# Patient Record
Sex: FEMALE | Race: WHITE | NOT HISPANIC OR LATINO | Employment: FULL TIME | URBAN - NONMETROPOLITAN AREA
[De-identification: names, ages, dates, MRNs, and addresses within clinical notes are randomized per-mention and may not be internally consistent; named-entity substitution may affect disease eponyms.]

---
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
## 2022-02-01 ENCOUNTER — TRANSCRIBE ORDERS (OUTPATIENT)
Dept: ADMINISTRATIVE | Facility: HOSPITAL | Age: 22
End: 2022-02-01

## 2022-02-01 DIAGNOSIS — N63.12 MASS OF UPPER INNER QUADRANT OF RIGHT BREAST: Primary | ICD-10-CM

## 2022-02-10 ENCOUNTER — OFFICE VISIT (OUTPATIENT)
Dept: OBSTETRICS AND GYNECOLOGY | Facility: CLINIC | Age: 22
End: 2022-02-10

## 2022-02-10 VITALS
SYSTOLIC BLOOD PRESSURE: 100 MMHG | DIASTOLIC BLOOD PRESSURE: 70 MMHG | WEIGHT: 151 LBS | BODY MASS INDEX: 26.75 KG/M2 | HEIGHT: 63 IN

## 2022-02-10 DIAGNOSIS — Z30.46 NEXPLANON REMOVAL: Primary | ICD-10-CM

## 2022-02-10 PROCEDURE — 11982 REMOVE DRUG IMPLANT DEVICE: CPT | Performed by: PHYSICIAN ASSISTANT

## 2022-02-10 NOTE — PROGRESS NOTES
Nexplanon Removal    Date of procedure:  2/10/2022    Risks and benefits discussed? yes  All questions answered? yes  Consents given by the patient  Written consent obtained? yes  Reason for removal: Device expiration    Local anesthesia used:  yes - 1.0 cc's of local anesthesia: 1% lidocaine with epinephrine    Procedure documentation:    The upper left arm was marked at the intended site of removal.  Betadine was used to cleanse the skin.  Local anesthesia was injected.  A vertical incision was created at the distal tip of the implant.  The implant was removed intact without difficulty.  Steri-strips were then placed across the site of insertion and the arm was wrapped.    She tolerated the procedure well.  There were no complications.  EBL was minimal.    Post procedure instructions: Remove the wrapping in 24 hours and the steri-strips in 5 days.    Follow up needed: PRN    This note was electronically signed.    Rani Medellin PA-C.  February 10, 2022

## 2022-02-10 NOTE — PROGRESS NOTES
"Subjective   Chief Complaint   Patient presents with   • Contraception     Nexplanon removal       Sis Lerma is a 21 y.o. year old  presenting to be seen for Nexplanon removal  Nexplanon inserted by [facility] almost 3 years ago. Wants to go ahead and get Nexplanon removed now  She does not want other birth control for now but plans on discussing with her mother and will RTO or call if decides on other contraception    Past Medical History:   Diagnosis Date   • ADHD (attention deficit hyperactivity disorder)    • Anxiety    • Headache    • Tourette syndrome       No current outpatient medications on file.   No Known Allergies   Past Surgical History:   Procedure Laterality Date   • TYMPANOSTOMY TUBE PLACEMENT        Social History     Socioeconomic History   • Marital status: Single   Tobacco Use   • Smoking status: Former Smoker     Packs/day: 0.33   • Smokeless tobacco: Never Used   Vaping Use   • Vaping Use: Every day   • Substances: Nicotine, Flavoring   • Devices: Disposable   Substance and Sexual Activity   • Alcohol use: Defer   • Drug use: Never   • Sexual activity: Yes     Partners: Male     Birth control/protection: None      Family History   Problem Relation Age of Onset   • Hypertension Mother    • No Known Problems Father    • Breast cancer Maternal Grandmother    • Colon cancer Maternal Grandmother    • Diabetes Maternal Grandmother    • Hyperlipidemia Maternal Grandmother    • Hypertension Maternal Grandmother        Review of Systems        Objective   /70   Ht 160.7 cm (63.25\")   Wt 68.5 kg (151 lb)   LMP 02/10/2022 (Exact Date)   Breastfeeding No   BMI 26.54 kg/m²     Physical Exam       Result Review :                   Assessment and Plan  There are no diagnoses linked to this encounter.  There are no Patient Instructions on file for this visit.           This note was electronically signed.    Rani Medellin PA-C   February 10, 2022  "

## 2022-02-14 ENCOUNTER — HOSPITAL ENCOUNTER (OUTPATIENT)
Dept: ULTRASOUND IMAGING | Facility: HOSPITAL | Age: 22
Discharge: HOME | End: 2022-02-14
Admitting: NURSE PRACTITIONER

## 2022-02-14 DIAGNOSIS — N63.12 MASS OF UPPER INNER QUADRANT OF RIGHT BREAST: ICD-10-CM

## 2022-02-14 PROCEDURE — 76641 ULTRASOUND BREAST COMPLETE: CPT

## 2022-09-12 PROCEDURE — U0004 COV-19 TEST NON-CDC HGH THRU: HCPCS | Performed by: PERSONAL EMERGENCY RESPONSE ATTENDANT

## 2023-03-09 ENCOUNTER — LAB (OUTPATIENT)
Dept: LAB | Facility: HOSPITAL | Age: 23
End: 2023-03-09
Payer: COMMERCIAL

## 2023-03-09 ENCOUNTER — TELEPHONE (OUTPATIENT)
Dept: OBSTETRICS AND GYNECOLOGY | Facility: CLINIC | Age: 23
End: 2023-03-09
Payer: COMMERCIAL

## 2023-03-09 DIAGNOSIS — Z34.90 PREGNANCY, UNSPECIFIED GESTATIONAL AGE: Primary | ICD-10-CM

## 2023-03-09 DIAGNOSIS — Z34.90 PREGNANCY, UNSPECIFIED GESTATIONAL AGE: ICD-10-CM

## 2023-03-09 LAB — HCG INTACT+B SERPL-ACNC: NORMAL MIU/ML

## 2023-03-09 PROCEDURE — 36415 COLL VENOUS BLD VENIPUNCTURE: CPT

## 2023-03-09 PROCEDURE — 84702 CHORIONIC GONADOTROPIN TEST: CPT

## 2023-03-09 NOTE — TELEPHONE ENCOUNTER
----- Message from Anel Duff sent at 3/9/2023  9:02 AM EST -----  Patient is scheduled for new ob appointment 04/11/2023. Patient is requesting HCG order. She is unclear on her last cycle.

## 2023-03-15 ENCOUNTER — TELEPHONE (OUTPATIENT)
Dept: OBSTETRICS AND GYNECOLOGY | Facility: CLINIC | Age: 23
End: 2023-03-15
Payer: COMMERCIAL

## 2023-03-15 NOTE — TELEPHONE ENCOUNTER
Pt states she had an HCG drawn on 3/9/23 to confirm her pregnancy. As of that date she is approx 7 weeks. Labs are in her chart. She states that her periods are irregular and sometimes skips a month so she is unsure of her LMP. Based off HCG ok to schedule. She requests an appt with LOS. She denies any bleeding or pain.     Glendy HERNANDEZ Will call pt back with appt and US. She VU

## 2023-03-16 ENCOUNTER — TELEPHONE (OUTPATIENT)
Dept: OBSTETRICS AND GYNECOLOGY | Facility: CLINIC | Age: 23
End: 2023-03-16

## 2023-03-16 NOTE — TELEPHONE ENCOUNTER
PROVIDER: DR. BULL    Caller: Sis Lerma    Relationship to patient: Self    Best call back number: [phone]    NEW OB PT HAS APPT ON 3-23 AT 2:30 FOR U/S AND VISIT AT 2:30. SHE JUST WANTS TO MAKE SURE THIS IS CORRECT.   NA AT NON CLINICAL  COULD SOMEONE CALL HER TO CONFIRM?   THANKS

## 2023-03-23 ENCOUNTER — INITIAL PRENATAL (OUTPATIENT)
Dept: OBSTETRICS AND GYNECOLOGY | Facility: CLINIC | Age: 23
End: 2023-03-23
Payer: COMMERCIAL

## 2023-03-23 VITALS — WEIGHT: 140 LBS | DIASTOLIC BLOOD PRESSURE: 62 MMHG | SYSTOLIC BLOOD PRESSURE: 104 MMHG | BODY MASS INDEX: 25.61 KG/M2

## 2023-03-23 DIAGNOSIS — Z36.9 ENCOUNTER FOR ANTENATAL SCREENING: Primary | ICD-10-CM

## 2023-03-23 DIAGNOSIS — Z34.91 FIRST TRIMESTER PREGNANCY: ICD-10-CM

## 2023-03-23 RX ORDER — PRENATAL VIT NO.126/IRON/FOLIC 28MG-0.8MG
TABLET ORAL DAILY
COMMUNITY

## 2023-03-23 NOTE — PROGRESS NOTES
Initial ob visit     CC- Here for care of pregnancy        Sis Lerma is a 22 y.o. female, , who presents for her first obstetrical visit.  Her last LMP was No LMP recorded (approximate). Patient is pregnant. Her JOHN is 10/23/2023, by Ultrasound. Current GA is 9w3d.     Initial positive test date : 23, UPT        Her periods are: every 20 days  Prior obstetric issues: none  Patient's past medical history is significant for: none.  Family history of genetic issues (includes FOB): none  Prior infections concerning in pregnancy (Rash, fever in last 2 weeks): No  Varicella Hx - unknown   Prior testing for Cystic Fibrosis Carrier or Sickle Cell Trait- no  Prepregnancy BMI - Body mass index is 25.61 kg/m².  History of STD: no  Hx of HSV for patient or partner: no  Ultrasound Today: yes,  bpm    OB History    Para Term  AB Living   1 0 0 0 0 0   SAB IAB Ectopic Molar Multiple Live Births   0 0 0 0 0 0      # Outcome Date GA Lbr Moreno/2nd Weight Sex Delivery Anes PTL Lv   1 Current                Additional Pertinent History   Last Pap : never Result: N/A HPV: N/A     Last Completed Pap Smear     This patient has no relevant Health Maintenance data.        History of abnormal Pap smear: N/A  Family history of uterine, colon, breast, or ovarian cancer: yes - Breast Ca- MGM; Colon Ca- MGM & MU  Feelings of Anxiety or Depression: no  Tobacco Usage?: No   Alcohol/Drug Use?: NO  Over the age of 35 at delivery: no  Desires Genetic Screening: Cell Free DNA  Flu Status: Declines    PMH    Current Outpatient Medications:   •  prenatal vitamin (prenatal, CLASSIC, vitamin) tablet, Take  by mouth Daily., Disp: , Rfl:      Past Medical History:   Diagnosis Date   • ADHD (attention deficit hyperactivity disorder)    • Anxiety    • Headache    • Tourette syndrome         Past Surgical History:   Procedure Laterality Date   • TYMPANOSTOMY TUBE PLACEMENT         Review of Systems   Review of  Systems  Patient Reports: Nausea and vomiting and Spotting. Patient states she had some spotting initially, but none since.  Patient Denies: Heavy bleeding, Cramping and Fatigue  All systems reviewed and otherwise normal.    I have reviewed and agree with the HPI, ROS, and historical information as entered above. Maddison Tierney MD    /62   Wt 63.5 kg (140 lb)   LMP  (Approximate)   BMI 25.61 kg/m²     The additional following portions of the patient's history were reviewed and updated as appropriate: allergies, current medications, past family history, past medical history, past social history, past surgical history and problem list.    Physical Exam  General:  well developed; well nourished  no acute distress   Chest/Respiratory: No labored breathing, normal respiratory effort, normal appearance, no respiratory noises noted   Heart:  normal rate, regular rhythm,  no murmurs, rubs, or gallops   Thyroid: normal to inspection and palpation   Breasts:  Not performed.   Abdomen: soft, non-tender; no masses  no umbilical or inguinal hernias are present  no hepato-splenomegaly   Pelvis: Pap done        Assessment and Plan    Problem List Items Addressed This Visit    None  Visit Diagnoses     Encounter for  screening    -  Primary    Relevant Orders    Obstetric Panel    Urine Culture - Urine, Urine, Clean Catch    HIV-1 / O / 2 Ag / Antibody 4th Generation    Urine Drug Screen - Urine, Clean Catch    LIQUID-BASED PAP SMEAR, [facility] ([facility])    [id number] PLUS Core+SCA+ESS - Blood,    First trimester pregnancy        Relevant Orders    [id number] PLUS Core+SCA+ESS - Blood,          1. Pregnancy at 9w3d  2. Reviewed routine prenatal care with the office and educational materials given  3. Discussed options for genetic testing including first trimester nuchal translucency screen, genetic disease carrier testing, quadruple screen, and NIPT   4. Encouraged vaccinations.        Maddison Tierney  MD  03/23/2023

## 2023-03-24 LAB — REF LAB TEST METHOD: NORMAL

## 2023-03-28 LAB
5P15 DELETION (CRI-DU-CHAT): NOT DETECTED
ABO GROUP BLD: ABNORMAL
AMPHETAMINES UR QL SCN: NEGATIVE NG/ML
BACTERIA UR CULT: NO GROWTH
BACTERIA UR CULT: NORMAL
BARBITURATES UR QL SCN: NEGATIVE NG/ML
BASOPHILS # BLD AUTO: 0 X10E3/UL (ref 0–0.2)
BASOPHILS NFR BLD AUTO: 0 %
BENZODIAZ UR QL SCN: NEGATIVE NG/ML
BLD GP AB SCN SERPL QL: NEGATIVE
BZE UR QL SCN: NEGATIVE NG/ML
CANNABINOIDS UR QL SCN: POSITIVE NG/ML
CFDNA.FET/CFDNA.TOTAL SFR FETUS: NORMAL %
CITATION REF LAB TEST: NORMAL
CREAT UR-MCNC: 40.2 MG/DL (ref 20–300)
EOSINOPHIL # BLD AUTO: 0.1 X10E3/UL (ref 0–0.4)
EOSINOPHIL NFR BLD AUTO: 1 %
ERYTHROCYTE [DISTWIDTH] IN BLOOD BY AUTOMATED COUNT: 12.4 % (ref 11.7–15.4)
FET 13+18+21+X+Y ANEUP PLAS.CFDNA: NEGATIVE
FET 1P36 DEL RISK WBC.DNA+CFDNA QL: NOT DETECTED
FET 22Q11.2 DEL RISK WBC.DNA+CFDNA QL: NOT DETECTED
FET CHR 11Q23 DEL PLAS.CFDNA QL: NOT DETECTED
FET CHR 15Q11 DEL PLAS.CFDNA QL: NOT DETECTED
FET CHR 21 TS PLAS.CFDNA QL: NEGATIVE
FET CHR 4P16 DEL PLAS.CFDNA QL: NOT DETECTED
FET CHR 8Q24 DEL PLAS.CFDNA QL: NOT DETECTED
FET MS X RISK WBC.DNA+CFDNA QL: NOT DETECTED
FET SEX PLAS.CFDNA DOSAGE CFDNA: NORMAL
FET TS 13 RISK PLAS.CFDNA QL: NEGATIVE
FET TS 18 RISK WBC.DNA+CFDNA QL: NEGATIVE
FET X + Y ANEUP RISK PLAS.CFDNA SEQ-IMP: NOT DETECTED
GA EST FROM CONCEPTION DATE: NORMAL D
GESTATIONAL AGE > 9:: YES
HBV SURFACE AG SERPL QL IA: NEGATIVE
HCT VFR BLD AUTO: 37 % (ref 34–46.6)
HCV IGG SERPL QL IA: NON REACTIVE
HGB BLD-MCNC: 12.6 G/DL (ref 11.1–15.9)
HIV 1+2 AB+HIV1 P24 AG SERPL QL IA: NON REACTIVE
IMM GRANULOCYTES # BLD AUTO: 0 X10E3/UL (ref 0–0.1)
IMM GRANULOCYTES NFR BLD AUTO: 0 %
LAB DIRECTOR NAME PROVIDER: NORMAL
LAB DIRECTOR NAME PROVIDER: NORMAL
LABORATORY COMMENT REPORT: ABNORMAL
LABORATORY COMMENT REPORT: NORMAL
LIMITATIONS OF THE TEST: NORMAL
LYMPHOCYTES # BLD AUTO: 2.1 X10E3/UL (ref 0.7–3.1)
LYMPHOCYTES NFR BLD AUTO: 20 %
MCH RBC QN AUTO: 34.3 PG (ref 26.6–33)
MCHC RBC AUTO-ENTMCNC: 34.1 G/DL (ref 31.5–35.7)
MCV RBC AUTO: 101 FL (ref 79–97)
METHADONE UR QL SCN: NEGATIVE NG/ML
MONOCYTES # BLD AUTO: 0.5 X10E3/UL (ref 0.1–0.9)
MONOCYTES NFR BLD AUTO: 5 %
NEGATIVE PREDICTIVE VALUE: NORMAL
NEUTROPHILS # BLD AUTO: 7.5 X10E3/UL (ref 1.4–7)
NEUTROPHILS NFR BLD AUTO: 74 %
NOTE: NORMAL
OPIATES UR QL SCN: NEGATIVE NG/ML
OXYCODONE+OXYMORPHONE UR QL SCN: NEGATIVE NG/ML
PCP UR QL: NEGATIVE NG/ML
PERFORMANCE CHARACTERISTICS: NORMAL
PH UR: 6.8 [PH] (ref 4.5–8.9)
PLATELET # BLD AUTO: 224 X10E3/UL (ref 150–450)
POSITIVE PREDICTIVE VALUE: NORMAL
PROPOXYPH UR QL SCN: NEGATIVE NG/ML
RBC # BLD AUTO: 3.67 X10E6/UL (ref 3.77–5.28)
REF LAB TEST METHOD: NORMAL
RH BLD: NEGATIVE
RPR SER QL: NON REACTIVE
RUBV IGG SERPL IA-ACNC: 1.93 INDEX
TEST PERFORMANCE INFO SPEC: NORMAL
TRIOSOMY 16: NOT DETECTED
TRISOMY 22: NOT DETECTED
WBC # BLD AUTO: 10.3 X10E3/UL (ref 3.4–10.8)

## 2023-03-29 ENCOUNTER — TELEPHONE (OUTPATIENT)
Dept: OBSTETRICS AND GYNECOLOGY | Facility: CLINIC | Age: 23
End: 2023-03-29
Payer: COMMERCIAL

## 2023-03-29 NOTE — TELEPHONE ENCOUNTER
DOMINIQUE WELLER    [phone]    PT IS 10wks HAVING NAUSEA WANTED TO KNOW WHAT CAN SHE TAKE     NAUSEA x1wk    PHARMACY: [facility]

## 2023-03-29 NOTE — TELEPHONE ENCOUNTER
[facility] OB patient  10w2d    S/w patient- instructed patient to begin taking Vitamin b6 and unisom. She v/u.

## 2023-04-06 ENCOUNTER — TELEPHONE (OUTPATIENT)
Dept: OBSTETRICS AND GYNECOLOGY | Facility: CLINIC | Age: 23
End: 2023-04-06
Payer: COMMERCIAL

## 2023-04-06 NOTE — TELEPHONE ENCOUNTER
Spoke with pt, informed her that her genetic testing came back negative and they are having a baby boy. Pt JUSTINO.

## 2023-04-06 NOTE — TELEPHONE ENCOUNTER
Pt called asking if her gender test has came back yet. Pt said it has been two weeks. Requesting call back

## 2023-04-27 ENCOUNTER — ROUTINE PRENATAL (OUTPATIENT)
Dept: OBSTETRICS AND GYNECOLOGY | Facility: CLINIC | Age: 23
End: 2023-04-27
Payer: COMMERCIAL

## 2023-04-27 VITALS — WEIGHT: 142.8 LBS | DIASTOLIC BLOOD PRESSURE: 72 MMHG | BODY MASS INDEX: 26.12 KG/M2 | SYSTOLIC BLOOD PRESSURE: 128 MMHG

## 2023-04-27 DIAGNOSIS — Z34.92 PRENATAL CARE IN SECOND TRIMESTER: Primary | ICD-10-CM

## 2023-04-27 DIAGNOSIS — Z36.89 ENCOUNTER FOR FETAL ANATOMIC SURVEY: ICD-10-CM

## 2023-04-27 LAB
GLUCOSE UR STRIP-MCNC: NEGATIVE MG/DL
PROT UR STRIP-MCNC: NEGATIVE MG/DL

## 2023-04-27 RX ORDER — DIPHENHYDRAMINE HYDROCHLORIDE 25 MG/1
25 CAPSULE ORAL DAILY
COMMUNITY

## 2023-04-27 NOTE — PROGRESS NOTES
OB FOLLOW UP  CC- Here for care of pregnancy        Sis Lerma is a 22 y.o.  14w3d patient being seen today for her obstetrical follow up visit. Patient reports headache (That is relieved by Tylenol or rest), low back pain (She denies dysuria), and occasional nausea. Patient states that she has not been able to take her prenatal vitamin without vomiting. Patient states that N/V is improved with Vitamin B6.     Her prenatal care is complicated by (and status) :  There is no problem list on file for this patient.      Desires genetic testing?: Already completed  Ultrasound Today: No    ROS -   Patient Reports : see above  Patient Denies: Loss of Fluid, Vaginal Spotting and Vision Changes  Fetal Movement : N/A  All other systems reviewed and are negative.     The additional following portions of the patient's history were reviewed and updated as appropriate: allergies and current medications.    I have reviewed and agree with the HPI, ROS, and historical information as entered above. Maddison Tierney MD    /72   Wt 64.8 kg (142 lb 12.8 oz)   LMP  (Approximate)   BMI 26.12 kg/m²         EXAM:     Prenatal Vitals  BP: 128/72  Weight: 64.8 kg (142 lb 12.8 oz)   Fetal Heart Rate: pos          Urine Glucose Read-only: Negative  Urine Protein Read-only: Negative       Assessment and Plan    Problem List Items Addressed This Visit    None  Visit Diagnoses     Prenatal care in second trimester    -  Primary    Relevant Orders    POC Urinalysis Dipstick (Completed)          1. Pregnancy at 14w3d  2. Labs reviewed from New OB Visit.  3. Counseled on genetic testing, carrier status and option for NT screen  4. Activity and Exercise discussed.  5. Patient is on Prenatal vitamins  Return in about 4 weeks (around 2023).    Maddison Tierney MD  2023

## 2023-05-26 ENCOUNTER — OFFICE VISIT (OUTPATIENT)
Dept: OBSTETRICS AND GYNECOLOGY | Facility: HOSPITAL | Age: 23
End: 2023-05-26
Payer: COMMERCIAL

## 2023-05-26 ENCOUNTER — HOSPITAL ENCOUNTER (OUTPATIENT)
Dept: WOMENS IMAGING | Facility: HOSPITAL | Age: 23
Discharge: HOME | End: 2023-05-26
Payer: COMMERCIAL

## 2023-05-26 ENCOUNTER — ROUTINE PRENATAL (OUTPATIENT)
Dept: OBSTETRICS AND GYNECOLOGY | Facility: CLINIC | Age: 23
End: 2023-05-26
Payer: COMMERCIAL

## 2023-05-26 VITALS — BODY MASS INDEX: 27.98 KG/M2 | DIASTOLIC BLOOD PRESSURE: 63 MMHG | WEIGHT: 153 LBS | SYSTOLIC BLOOD PRESSURE: 113 MMHG

## 2023-05-26 VITALS — DIASTOLIC BLOOD PRESSURE: 60 MMHG | BODY MASS INDEX: 27.98 KG/M2 | WEIGHT: 153 LBS | SYSTOLIC BLOOD PRESSURE: 102 MMHG

## 2023-05-26 DIAGNOSIS — Z36.89 ENCOUNTER FOR FETAL ANATOMIC SURVEY: ICD-10-CM

## 2023-05-26 DIAGNOSIS — Z34.92 PRENATAL CARE IN SECOND TRIMESTER: ICD-10-CM

## 2023-05-26 DIAGNOSIS — Z3A.18 18 WEEKS GESTATION OF PREGNANCY: Primary | ICD-10-CM

## 2023-05-26 DIAGNOSIS — Z36.89 ENCOUNTER FOR FETAL ANATOMIC SURVEY: Primary | ICD-10-CM

## 2023-05-26 PROBLEM — F42.9 OCD (OBSESSIVE COMPULSIVE DISORDER): Status: ACTIVE | Noted: 2017-01-24

## 2023-05-26 LAB
EXPIRATION DATE: 0
GLUCOSE UR STRIP-MCNC: NEGATIVE MG/DL
Lab: 0
PROT UR STRIP-MCNC: NEGATIVE MG/DL

## 2023-05-26 PROCEDURE — 76811 OB US DETAILED SNGL FETUS: CPT

## 2023-05-26 NOTE — PROGRESS NOTES
Patient seen in Maternal Fetal Medicine clinic today. Please see full note in under imaging tab of patient chart in Epic (Viewpoint report).    Surekha Bolaños MD

## 2023-05-26 NOTE — PROGRESS NOTES
OB FOLLOW UP  CC- Here for care of pregnancy        Sis Lerma is a 22 y.o.  18w4d patient being seen today for her obstetrical follow up visit. Patient reports occasional round ligament pains at RLQ.    Her prenatal care is complicated by (and status): None  Patient Active Problem List   Diagnosis   • ADHD (attention deficit hyperactivity disorder)   • Migraine with aura   • OCD (obsessive compulsive disorder)   • Tourette syndrome   • Anxiety       Flu Status: not currently flu season  Ultrasound Today: Yes, at PDC (report not yet returned). Patient reports they informed her all was well.    AFP: desires    ROS -   Patient Reports: see above  Patient Denies: Loss of Fluid, Vaginal Spotting, Vision Changes, Headaches, Nausea , Vomiting  and Contractions  Fetal Movement : normal  All other systems reviewed and are negative.       The additional following portions of the patient's history were reviewed and updated as appropriate: allergies, current medications, past family history, past medical history, past social history, past surgical history and problem list.      I have reviewed and agree with the HPI, ROS, and historical information as entered above. Judith Rosen, APRN    EXAM:     Prenatal Vitals  BP: 102/60  Weight: 69.4 kg (153 lb)   Fetal Heart Rate: PDC   Pelvic Exam:        Urine Glucose Read-only: Negative  Urine Protein Read-only: Negative       Assessment and Plan    Problem List Items Addressed This Visit    None  Visit Diagnoses     18 weeks gestation of pregnancy    -  Primary    Relevant Orders    POC Glucose, Urine, Qualitative, Dipstick (Completed)    POC Protein, Urine, Qualitative, Dipstick (Completed)    Alpha Fetoprotein, Maternal    Prenatal care in second trimester        Relevant Orders    Alpha Fetoprotein, Maternal          1. Pregnancy at 18w4d  2. Fetal status reassuring. PDC scan today WNL per patient. Will follow up in August. Report pending.  3. Counseled on MSAFP  alone in relation to OTD and placental issues.    4. Patient can not tolerate prenatal vitamins.  5. Activity and Exercise discussed.  6. Lab(s) Ordered  7. Patient is on Prenatal vitamins  8. Reviewed UDS and risks during pregnancy; will repeat monthly +THC  9. Motherhood connection referral placed.  10. Follow up in four weeks.   Judith Rosen, APRN  05/26/2023

## 2023-05-29 LAB
AFP INTERP SERPL-IMP: NORMAL
AFP INTERP SERPL-IMP: NORMAL
AFP MOM SERPL: 1.17
AFP SERPL-MCNC: 56.2 NG/ML
AGE AT DELIVERY: 22 YR
GA METHOD: NORMAL
GA: 18.6 WEEKS
IDDM PATIENT QL: NO
LABORATORY COMMENT REPORT: NORMAL
MULTIPLE PREGNANCY: NO
NEURAL TUBE DEFECT RISK FETUS: 7137 %
RESULT: NORMAL

## 2023-06-01 ENCOUNTER — REFERRAL TRIAGE (OUTPATIENT)
Dept: LABOR AND DELIVERY | Facility: HOSPITAL | Age: 23
End: 2023-06-01

## 2023-07-26 ENCOUNTER — ROUTINE PRENATAL (OUTPATIENT)
Dept: OBSTETRICS AND GYNECOLOGY | Facility: CLINIC | Age: 23
End: 2023-07-26
Payer: COMMERCIAL

## 2023-07-26 VITALS — SYSTOLIC BLOOD PRESSURE: 110 MMHG | BODY MASS INDEX: 30.91 KG/M2 | DIASTOLIC BLOOD PRESSURE: 76 MMHG | WEIGHT: 169 LBS

## 2023-07-26 DIAGNOSIS — Z67.91 RH NEGATIVE STATUS DURING PREGNANCY IN SECOND TRIMESTER: ICD-10-CM

## 2023-07-26 DIAGNOSIS — Z34.92 PRENATAL CARE IN SECOND TRIMESTER: Primary | ICD-10-CM

## 2023-07-26 DIAGNOSIS — O26.892 RH NEGATIVE STATUS DURING PREGNANCY IN SECOND TRIMESTER: ICD-10-CM

## 2023-07-26 LAB
GLUCOSE UR STRIP-MCNC: NEGATIVE MG/DL
PROT UR STRIP-MCNC: NEGATIVE MG/DL

## 2023-07-26 NOTE — PROGRESS NOTES
OB FOLLOW UP  CC- Here for care of pregnancy        Sis Lerma is a 22 y.o.  27w2d patient being seen today for her obstetrical follow up. Patient reports occasional round ligament pain.     Patient undergoing Glucola testing today. She is due for her testing at 9:40 AM.       MBT: O-  Rhogam: will be given today.  28 week packet: reviewed with patient , counseled on fetal movement , pediatrician list reviewed, breast pump discussed, and childbirth classes reviewed  TDAP: given today  Ultrasound Today: No    Her prenatal care is complicated by (and status) :    Patient Active Problem List   Diagnosis    ADHD (attention deficit hyperactivity disorder)    Migraine with aura    OCD (obsessive compulsive disorder)    Tourette syndrome    Anxiety         ROS -   Patient Reports :  see above  Patient Denies: Loss of Fluid, Vaginal Spotting, Vision Changes, Headaches, Nausea , Vomiting , Contractions, and Epigastric pain  Fetal Movement : normal    The additional following portions of the patient's history were reviewed and updated as appropriate: allergies and current medications.    I have reviewed and agree with the HPI, ROS, and historical information as entered above. Maddison Tierney MD      /76   Wt 76.7 kg (169 lb)   LMP  (Approximate)   BMI 30.91 kg/m²         EXAM:     Prenatal Vitals  BP: 110/76  Weight: 76.7 kg (169 lb)   Fetal Heart Rate: pos               Urine Glucose Read-only: Negative  Urine Protein Read-only: Negative         Assessment and Plan    Problem List Items Addressed This Visit    None  Visit Diagnoses       Prenatal care in second trimester    -  Primary    Relevant Orders    POC Urinalysis Dipstick (Completed)    Rhogam Immune Globulin Immunization    Tdap Vaccine Greater Than or Equal To 8yo IM (Completed)    CBC (No Diff)    Gestational Screen 1 Hr ([facility])    Antibody Screen    Rh negative status during pregnancy in second trimester        Relevant Orders    Rhogam  Immune Globulin Immunization            Pregnancy at 27w2d  1 hr Glucola, CBC, and antibody screen today  and TDAP given today  Fetal movement/PTL or Labor precautions  Activity and Exercise discussed.  5.  PDC scan f/u scheduled for advanced paternal age.    Maddison Tierney MD  07/26/2023

## 2023-07-27 LAB
BLD GP AB SCN SERPL QL: NEGATIVE
ERYTHROCYTE [DISTWIDTH] IN BLOOD BY AUTOMATED COUNT: 12.2 % (ref 12.3–15.4)
GLUCOSE 1H P 50 G GLC PO SERPL-MCNC: 64 MG/DL (ref 65–139)
HCT VFR BLD AUTO: 33.3 % (ref 34–46.6)
HGB BLD-MCNC: 11.8 G/DL (ref 12–15.9)
MCH RBC QN AUTO: 36.2 PG (ref 26.6–33)
MCHC RBC AUTO-ENTMCNC: 35.4 G/DL (ref 31.5–35.7)
MCV RBC AUTO: 102.1 FL (ref 79–97)
PLATELET # BLD AUTO: 180 10*3/MM3 (ref 140–450)
RBC # BLD AUTO: 3.26 10*6/MM3 (ref 3.77–5.28)
WBC # BLD AUTO: 9.33 10*3/MM3 (ref 3.4–10.8)

## 2023-07-28 ENCOUNTER — TELEPHONE (OUTPATIENT)
Dept: OBSTETRICS AND GYNECOLOGY | Facility: CLINIC | Age: 23
End: 2023-07-28
Payer: COMMERCIAL

## 2023-07-28 DIAGNOSIS — Z34.92 SECOND TRIMESTER PREGNANCY: Primary | ICD-10-CM

## 2023-07-28 RX ORDER — ASCORBIC ACID, CHOLECALCIFEROL, .ALPHA.-TOCOPHEROL ACETATE, DL-, PYRIDOXINE HYDROCHLORIDE, FOLIC ACID, CYANOCOBALAMIN, BIOTIN, CALCIUM CARBONATE, FERROUS ASPARTO GLYCINATE, IRON, POTASSIUM IODIDE, MAGNESIUM OXIDE, DOCONEXENT AND LOWBUSH BLUEBERRY 60; 1000; 10; 26; 400; 13; 280; 80; 9; 9; 150; 25; 350; 25; 600 MG/1; [IU]/1; [IU]/1; MG/1; UG/1; UG/1; UG/1; MG/1; MG/1; MG/1; UG/1; MG/1; MG/1; MG/1; UG/1
1 CAPSULE, GELATIN COATED ORAL DAILY
Qty: 30 CAPSULE | Refills: 4 | Status: SHIPPED | OUTPATIENT
Start: 2023-07-28

## 2023-07-28 RX ORDER — SWAB
1 SWAB, NON-MEDICATED MISCELLANEOUS DAILY
Qty: 90 EACH | Refills: 1 | Status: SHIPPED | OUTPATIENT
Start: 2023-07-28 | End: 2023-07-28

## 2023-07-28 NOTE — TELEPHONE ENCOUNTER
Pt is requesting a refill of her prenatal. She states LOS had given her a sample of the prenate mini and wanted to have these sent in since she was able to take them. Pt VU that I have sent in refills to last until the end of her pregnancy

## 2023-07-31 ENCOUNTER — TELEPHONE (OUTPATIENT)
Dept: OBSTETRICS AND GYNECOLOGY | Facility: CLINIC | Age: 23
End: 2023-07-31
Payer: COMMERCIAL

## 2023-08-04 ENCOUNTER — OFFICE VISIT (OUTPATIENT)
Dept: OBSTETRICS AND GYNECOLOGY | Facility: HOSPITAL | Age: 23
End: 2023-08-04
Payer: COMMERCIAL

## 2023-08-04 ENCOUNTER — HOSPITAL ENCOUNTER (OUTPATIENT)
Dept: WOMENS IMAGING | Facility: HOSPITAL | Age: 23
Discharge: HOME | End: 2023-08-04
Admitting: OBSTETRICS & GYNECOLOGY
Payer: COMMERCIAL

## 2023-08-04 VITALS — WEIGHT: 170.2 LBS | DIASTOLIC BLOOD PRESSURE: 66 MMHG | SYSTOLIC BLOOD PRESSURE: 115 MMHG | BODY MASS INDEX: 31.13 KG/M2

## 2023-08-04 DIAGNOSIS — F95.2 TOURETTE SYNDROME: ICD-10-CM

## 2023-08-04 DIAGNOSIS — Z36.89 ENCOUNTER FOR FETAL ANATOMIC SURVEY: ICD-10-CM

## 2023-08-04 DIAGNOSIS — F41.9 ANXIETY: Primary | ICD-10-CM

## 2023-08-04 DIAGNOSIS — Z3A.28 28 WEEKS GESTATION OF PREGNANCY: ICD-10-CM

## 2023-08-04 PROCEDURE — 76816 OB US FOLLOW-UP PER FETUS: CPT

## 2023-08-24 ENCOUNTER — ROUTINE PRENATAL (OUTPATIENT)
Dept: OBSTETRICS AND GYNECOLOGY | Facility: CLINIC | Age: 23
End: 2023-08-24
Payer: COMMERCIAL

## 2023-08-24 VITALS — WEIGHT: 175.8 LBS | BODY MASS INDEX: 32.15 KG/M2 | SYSTOLIC BLOOD PRESSURE: 112 MMHG | DIASTOLIC BLOOD PRESSURE: 62 MMHG

## 2023-08-24 DIAGNOSIS — Z34.90 PRENATAL CARE, ANTEPARTUM: Primary | ICD-10-CM

## 2023-08-24 LAB
GLUCOSE UR STRIP-MCNC: NEGATIVE MG/DL
PROT UR STRIP-MCNC: NEGATIVE MG/DL

## 2023-08-24 NOTE — PROGRESS NOTES
OB FOLLOW UP  CC- Here for care of pregnancy        Sis Lerma is a 22 y.o.  31w3d patient being seen today for her obstetrical follow up visit. Patient reports tightness in upper part of abdomen and occasional cramping. Pt also reports increased heartburn unrelieved with OTC medication.     Her prenatal care is complicated by (and status) :   Patient Active Problem List   Diagnosis    ADHD (attention deficit hyperactivity disorder)    Migraine with aura    OCD (obsessive compulsive disorder)    Tourette syndrome    Anxiety         TDAP status: received at last visit  Rhogam status: was given at last visit  28 week labs: Reviewed and Labs show anemia. She is not taking additional iron supplement.  Ultrasound Today: No  Non Stress Test: No.      ROS -   Patient Denies: Loss of Fluid, Vaginal Spotting, Vision Changes, Headaches, Nausea , Vomiting , and Epigastric pain  Fetal Movement : normal  All other systems reviewed and are negative.       The additional following portions of the patient's history were reviewed and updated as appropriate: allergies, current medications, past family history, past medical history, past social history, past surgical history, and problem list.    I have reviewed and agree with the HPI, ROS, and historical information as entered above. Maddison Tierney MD      /62   Wt 79.7 kg (175 lb 12.8 oz)   LMP  (Approximate)   BMI 32.15 kg/mý         EXAM:     Prenatal Vitals  BP: 112/62  Weight: 79.7 kg (175 lb 12.8 oz)   Fetal Heart Rate: pos               Urine Glucose Read-only: Negative  Urine Protein Read-only: Negative           Assessment and Plan    Problem List Items Addressed This Visit    None  Visit Diagnoses       Prenatal care, antepartum    -  Primary    Relevant Orders    POC Urinalysis Dipstick (Completed)            Pregnancy at 31w3d  Fetal status reassuring.  28 week labs reviewed.    Activity and Exercise discussed.  Fetal movement/PTL or Labor  precautions  F/U PDC scan for advanced paternal age.      Maddison Tierney MD  08/24/2023

## 2023-09-07 ENCOUNTER — ROUTINE PRENATAL (OUTPATIENT)
Dept: OBSTETRICS AND GYNECOLOGY | Facility: CLINIC | Age: 23
End: 2023-09-07
Payer: COMMERCIAL

## 2023-09-07 VITALS — WEIGHT: 179.4 LBS | BODY MASS INDEX: 32.81 KG/M2 | SYSTOLIC BLOOD PRESSURE: 118 MMHG | DIASTOLIC BLOOD PRESSURE: 72 MMHG

## 2023-09-07 DIAGNOSIS — Z34.93 PRENATAL CARE IN THIRD TRIMESTER: Primary | ICD-10-CM

## 2023-09-07 LAB
GLUCOSE UR STRIP-MCNC: NEGATIVE MG/DL
PROT UR STRIP-MCNC: NEGATIVE MG/DL

## 2023-09-07 RX ORDER — UREA 10 %
LOTION (ML) TOPICAL
COMMUNITY

## 2023-09-07 NOTE — PROGRESS NOTES
OB FOLLOW UP  CC- Here for care of pregnancy        Sis Lerma is a 22 y.o.  33w3d patient being seen today for her obstetrical follow up visit. Patient reports swelling in both lower extremities after being on her feet all day. It is Non-Pitting.     Her prenatal care is complicated by (and status) :   Patient Active Problem List   Diagnosis    ADHD (attention deficit hyperactivity disorder)    Migraine with aura    OCD (obsessive compulsive disorder)    Tourette syndrome    Anxiety         Ultrasound Today: No, Patient had US at [facility] on 23  Non Stress Test: No.    ROS -   Patient Reports :  see above  Patient Denies: Loss of Fluid, Vaginal Spotting, Vision Changes, Headaches, Nausea , Vomiting , Contractions, and Epigastric pain  Fetal Movement : normal  All other systems reviewed and are negative.       The additional following portions of the patient's history were reviewed and updated as appropriate: allergies and current medications.    I have reviewed and agree with the HPI, ROS, and historical information as entered above. Maddison Tierney MD      /72   Wt 81.4 kg (179 lb 6.4 oz)   LMP  (Approximate)   BMI 32.81 kg/m²       EXAM:     Prenatal Vitals  BP: 118/72  Weight: 81.4 kg (179 lb 6.4 oz)   Fetal Heart Rate: pos               Urine Glucose Read-only: Negative  Urine Protein Read-only: Negative           Assessment and Plan    Problem List Items Addressed This Visit    None  Visit Diagnoses       Prenatal care in third trimester    -  Primary    Relevant Orders    POC Urinalysis Dipstick (Completed)            Pregnancy at 33w3d  Fetal status reassuring.   Activity and Exercise discussed.  Fetal movement/PTL or Labor precautions  Return in about 2 weeks (around 2023).    Maddison Tierney MD  2023

## 2023-09-19 ENCOUNTER — ROUTINE PRENATAL (OUTPATIENT)
Dept: OBSTETRICS AND GYNECOLOGY | Facility: CLINIC | Age: 23
End: 2023-09-19
Payer: COMMERCIAL

## 2023-09-19 ENCOUNTER — TELEPHONE (OUTPATIENT)
Dept: OBSTETRICS AND GYNECOLOGY | Facility: CLINIC | Age: 23
End: 2023-09-19
Payer: COMMERCIAL

## 2023-09-19 VITALS — BODY MASS INDEX: 33.47 KG/M2 | DIASTOLIC BLOOD PRESSURE: 68 MMHG | SYSTOLIC BLOOD PRESSURE: 110 MMHG | WEIGHT: 183 LBS

## 2023-09-19 DIAGNOSIS — Z34.93 THIRD TRIMESTER PREGNANCY: Primary | ICD-10-CM

## 2023-09-19 LAB
GLUCOSE UR STRIP-MCNC: NEGATIVE MG/DL
PROT UR STRIP-MCNC: NEGATIVE MG/DL

## 2023-09-19 NOTE — PROGRESS NOTES
OB FOLLOW UP  CC- Here for care of pregnancy        Sis Lerma is a 22 y.o.  35w1d patient being seen today for her obstetrical follow up visit. Patient reports no complaints.. Pt feels well, good fm, no ctx, no c/o    Her prenatal care is complicated by (and status) : None  Patient Active Problem List   Diagnosis    ADHD (attention deficit hyperactivity disorder)    Migraine with aura    OCD (obsessive compulsive disorder)    Tourette syndrome    Anxiety       Flu Status: Declines  Ultrasound Today: No  Non Stress Test: No.    ROS -   Patient Reports : No Problems  Patient Denies: Loss of Fluid, Vaginal Spotting, Vision Changes, Headaches, Nausea , Vomiting , Contractions, and Epigastric pain  Fetal Movement : normal  All other systems reviewed and are negative.       The additional following portions of the patient's history were reviewed and updated as appropriate: allergies, current medications, past family history, past medical history, past social history, past surgical history, and problem list.    I have reviewed and agree with the HPI, ROS, and historical information as entered above. Tanya Dan, APRN      /68   Wt 83 kg (183 lb)   LMP  (Approximate)   BMI 33.47 kg/m²       EXAM:     Prenatal Vitals  BP: 110/68  Weight: 83 kg (183 lb)   Fetal Heart Rate: 135               Urine Glucose Read-only: Negative  Urine Protein Read-only: Negative           Assessment and Plan    Problem List Items Addressed This Visit    None  Visit Diagnoses       Third trimester pregnancy    -  Primary    Relevant Orders    POC Urinalysis Dipstick (Completed)            Pregnancy at 35w1d  Fetal status reassuring.   Activity and Exercise discussed.  Fetal movement/PTL or Labor precautions  Patient is on Prenatal vitamins  GBS next visit  Return in about 1 week (around 2023) for JOEL FERNANDEZ.    DALY Munguia  2023

## 2023-09-26 ENCOUNTER — ROUTINE PRENATAL (OUTPATIENT)
Dept: OBSTETRICS AND GYNECOLOGY | Facility: CLINIC | Age: 23
End: 2023-09-26
Payer: COMMERCIAL

## 2023-09-26 ENCOUNTER — LAB (OUTPATIENT)
Dept: LAB | Facility: HOSPITAL | Age: 23
End: 2023-09-26
Payer: COMMERCIAL

## 2023-09-26 VITALS — BODY MASS INDEX: 34.53 KG/M2 | DIASTOLIC BLOOD PRESSURE: 72 MMHG | WEIGHT: 188.8 LBS | SYSTOLIC BLOOD PRESSURE: 118 MMHG

## 2023-09-26 DIAGNOSIS — Z34.93 THIRD TRIMESTER PREGNANCY: Primary | ICD-10-CM

## 2023-09-26 DIAGNOSIS — Z34.93 PRENATAL CARE IN THIRD TRIMESTER: Primary | ICD-10-CM

## 2023-09-26 LAB
GLUCOSE UR STRIP-MCNC: NEGATIVE MG/DL
PROT UR STRIP-MCNC: NEGATIVE MG/DL

## 2023-09-26 PROCEDURE — 87081 CULTURE SCREEN ONLY: CPT

## 2023-09-26 NOTE — PROGRESS NOTES
OB FOLLOW UP  CC- Here for care of pregnancy        Sis Lerma is a 23 y.o.  36w1d patient being seen today for her obstetrical follow up visit. Patient reports swelling in both lower extremities. It is Non-Pitting. Patient also reports some lower abdominal cramping last Friday. Patient states she had to work 11 hours that day. Patient is a  & was on her feet. Patient states that the cramping went away after she got off her feet.     Her prenatal care is complicated by (and status) :   Patient Active Problem List   Diagnosis    ADHD (attention deficit hyperactivity disorder)    Migraine with aura    OCD (obsessive compulsive disorder)    Tourette syndrome    Anxiety       GBS Status: Done Today. She is not allergic to PCN.    No Known Allergies       Flu Status: Declines  Her Delivery Plan is: Desires IOL at 39wks. Needs to be scheduled    US today: no  Non Stress Test: No.    ROS -   Patient Reports :  see above  Patient Denies: Loss of Fluid, Vaginal Spotting, Vision Changes, Headaches, Nausea , Vomiting , Contractions, and Epigastric pain  Fetal Movement : normal  All other systems reviewed and are negative.       The additional following portions of the patient's history were reviewed and updated as appropriate: allergies and current medications.    I have reviewed and agree with the HPI, ROS, and historical information as entered above. Maddison Tierney MD        EXAM:     Prenatal Vitals  BP: 118/72  Weight: 85.6 kg (188 lb 12.8 oz)   Fetal Heart Rate: pos              Urine Glucose Read-only: Negative  Urine Protein Read-only: Negative           Assessment and Plan    Problem List Items Addressed This Visit    None  Visit Diagnoses       Prenatal care in third trimester    -  Primary    Relevant Orders    Strep B Screen - Swab, Vaginal/Rectum    POC Urinalysis Dipstick (Completed)            Pregnancy at 36w1d  Fetal status reassuring.   Reviewed Pre-eclampsia signs/symptoms  Discussed IOL  options with patient. Pt. desires IOL at 39 weeks.   Delivery options reviewed with patient  Signs of labor reviewed  Kick counts reviewed  Activity and Exercise discussed.  Return in about 1 week (around 10/3/2023).    Maddison Tierney MD  09/26/2023

## 2023-09-28 LAB — BACTERIA SPEC AEROBE CULT: ABNORMAL

## 2023-10-02 ENCOUNTER — ROUTINE PRENATAL (OUTPATIENT)
Dept: OBSTETRICS AND GYNECOLOGY | Facility: CLINIC | Age: 23
End: 2023-10-02
Payer: COMMERCIAL

## 2023-10-02 VITALS — BODY MASS INDEX: 34.79 KG/M2 | SYSTOLIC BLOOD PRESSURE: 112 MMHG | DIASTOLIC BLOOD PRESSURE: 64 MMHG | WEIGHT: 190.2 LBS

## 2023-10-02 DIAGNOSIS — Z34.90 PRENATAL CARE, ANTEPARTUM: Primary | ICD-10-CM

## 2023-10-02 LAB
GLUCOSE UR STRIP-MCNC: NEGATIVE MG/DL
PROT UR STRIP-MCNC: NEGATIVE MG/DL

## 2023-10-02 NOTE — PROGRESS NOTES
OB FOLLOW UP  CC- Here for care of pregnancy        Sis Lerma is a 23 y.o.  37w0d patient being seen today for her obstetrical follow up visit. Patient reports no complaints.     Her prenatal care is complicated by (and status) : None  Patient Active Problem List   Diagnosis    ADHD (attention deficit hyperactivity disorder)    Migraine with aura    OCD (obsessive compulsive disorder)    Tourette syndrome    Anxiety       GBS Status: POSITIVE on 23  Group B Strep Culture   Date Value Ref Range Status   2023 Streptococcus agalactiae (Group B) (A)  Final     Comment:       This organism is considered to be universally susceptible to penicillin.  No further antibiotic testing will be performed. If Clindamycin or Erythromycin is the drug of choice, notify the laboratory within 7 days to request susceptibility testing.         No Known Allergies       Her Delivery Plan is: Desires IOL at 39wks. Scheduled  10/16 @ 9:30PM  US today: no  Non Stress Test: No.    ROS -   Patient Reports : No Problems  Patient Denies: Loss of Fluid, Vaginal Spotting, Vision Changes, Headaches, Nausea , Vomiting , and Contractions  Fetal Movement : normal  All other systems reviewed and are negative.       The additional following portions of the patient's history were reviewed and updated as appropriate: allergies, current medications, past family history, past medical history, past social history, past surgical history, and problem list.    I have reviewed and agree with the HPI, ROS, and historical information as entered above. Georgina Henderson, DALY        EXAM:     Prenatal Vitals  BP: 112/64  Weight: 86.3 kg (190 lb 3.2 oz)   Fetal Heart Rate: 150            Urine Glucose Read-only: Negative  Urine Protein Read-only: Negative       Assessment and Plan    Problem List Items Addressed This Visit    None  Visit Diagnoses       Prenatal care, antepartum    -  Primary    Relevant Orders    POC Urinalysis Dipstick  (Completed)            Pregnancy at 37w0d  Fetal status reassuring.   Reviewed Pre-eclampsia signs/symptoms  Delivery options reviewed with patient  Signs of labor reviewed  Kick counts reviewed  Activity and Exercise discussed.  IOL Scheduled 10/16 @ 9:30PM  Return in about 1 week (around 10/9/2023) for Niall Lipscomb.    Georgina Henderson, APRN  10/02/2023

## 2023-10-09 ENCOUNTER — ROUTINE PRENATAL (OUTPATIENT)
Dept: OBSTETRICS AND GYNECOLOGY | Facility: CLINIC | Age: 23
End: 2023-10-09
Payer: COMMERCIAL

## 2023-10-09 VITALS — DIASTOLIC BLOOD PRESSURE: 74 MMHG | BODY MASS INDEX: 35.85 KG/M2 | SYSTOLIC BLOOD PRESSURE: 118 MMHG | WEIGHT: 196 LBS

## 2023-10-09 DIAGNOSIS — Z34.90 PRENATAL CARE, ANTEPARTUM: Primary | ICD-10-CM

## 2023-10-09 LAB
GLUCOSE UR STRIP-MCNC: NEGATIVE MG/DL
PROT UR STRIP-MCNC: ABNORMAL MG/DL

## 2023-10-09 RX ORDER — HYDROCODONE BITARTRATE AND ACETAMINOPHEN 5; 325 MG/1; MG/1
1 TABLET ORAL EVERY 4 HOURS PRN
OUTPATIENT
Start: 2023-10-09 | End: 2023-10-16

## 2023-10-09 RX ORDER — SODIUM CHLORIDE 9 MG/ML
40 INJECTION, SOLUTION INTRAVENOUS AS NEEDED
OUTPATIENT
Start: 2023-10-09

## 2023-10-09 RX ORDER — OXYTOCIN/0.9 % SODIUM CHLORIDE 30/500 ML
999 PLASTIC BAG, INJECTION (ML) INTRAVENOUS ONCE
OUTPATIENT
Start: 2023-10-09 | End: 2023-10-09

## 2023-10-09 RX ORDER — MISOPROSTOL 100 UG/1
800 TABLET ORAL AS NEEDED
OUTPATIENT
Start: 2023-10-09

## 2023-10-09 RX ORDER — SODIUM CHLORIDE 0.9 % (FLUSH) 0.9 %
10 SYRINGE (ML) INJECTION EVERY 12 HOURS SCHEDULED
OUTPATIENT
Start: 2023-10-09

## 2023-10-09 RX ORDER — MAGNESIUM CARB/ALUMINUM HYDROX 105-160MG
30 TABLET,CHEWABLE ORAL ONCE
OUTPATIENT
Start: 2023-10-09 | End: 2023-10-09

## 2023-10-09 RX ORDER — OXYTOCIN/0.9 % SODIUM CHLORIDE 30/500 ML
2-20 PLASTIC BAG, INJECTION (ML) INTRAVENOUS
OUTPATIENT
Start: 2023-10-09

## 2023-10-09 RX ORDER — SODIUM CHLORIDE, SODIUM LACTATE, POTASSIUM CHLORIDE, CALCIUM CHLORIDE 600; 310; 30; 20 MG/100ML; MG/100ML; MG/100ML; MG/100ML
125 INJECTION, SOLUTION INTRAVENOUS CONTINUOUS
OUTPATIENT
Start: 2023-10-09

## 2023-10-09 RX ORDER — TERBUTALINE SULFATE 1 MG/ML
0.25 INJECTION, SOLUTION SUBCUTANEOUS AS NEEDED
OUTPATIENT
Start: 2023-10-09

## 2023-10-09 RX ORDER — IBUPROFEN 200 MG
600 TABLET ORAL EVERY 6 HOURS PRN
OUTPATIENT
Start: 2023-10-09

## 2023-10-09 RX ORDER — CARBOPROST TROMETHAMINE 250 UG/ML
250 INJECTION, SOLUTION INTRAMUSCULAR AS NEEDED
OUTPATIENT
Start: 2023-10-09

## 2023-10-09 RX ORDER — OXYTOCIN/0.9 % SODIUM CHLORIDE 30/500 ML
250 PLASTIC BAG, INJECTION (ML) INTRAVENOUS CONTINUOUS
OUTPATIENT
Start: 2023-10-09 | End: 2023-10-09

## 2023-10-09 RX ORDER — HYDROCODONE BITARTRATE AND ACETAMINOPHEN 10; 325 MG/1; MG/1
1 TABLET ORAL EVERY 4 HOURS PRN
OUTPATIENT
Start: 2023-10-09 | End: 2023-10-16

## 2023-10-09 RX ORDER — ONDANSETRON 4 MG/1
4 TABLET, FILM COATED ORAL EVERY 6 HOURS PRN
OUTPATIENT
Start: 2023-10-09

## 2023-10-09 RX ORDER — LIDOCAINE HYDROCHLORIDE 10 MG/ML
0.5 INJECTION, SOLUTION EPIDURAL; INFILTRATION; INTRACAUDAL; PERINEURAL ONCE AS NEEDED
OUTPATIENT
Start: 2023-10-09

## 2023-10-09 RX ORDER — ACETAMINOPHEN 325 MG/1
650 TABLET ORAL EVERY 4 HOURS PRN
OUTPATIENT
Start: 2023-10-09

## 2023-10-09 RX ORDER — METHYLERGONOVINE MALEATE 0.2 MG/ML
200 INJECTION INTRAVENOUS ONCE AS NEEDED
OUTPATIENT
Start: 2023-10-09

## 2023-10-09 RX ORDER — ONDANSETRON 2 MG/ML
4 INJECTION INTRAMUSCULAR; INTRAVENOUS EVERY 6 HOURS PRN
OUTPATIENT
Start: 2023-10-09

## 2023-10-09 RX ORDER — SODIUM CHLORIDE 0.9 % (FLUSH) 0.9 %
10 SYRINGE (ML) INJECTION AS NEEDED
OUTPATIENT
Start: 2023-10-09

## 2023-10-09 NOTE — PROGRESS NOTES
OB FOLLOW UP  CC- Here for care of pregnancy        Sis Lerma is a 23 y.o.  38w0d patient being seen today for her obstetrical follow up visit. Patient reports no complaints.     Her prenatal care is complicated by (and status) :   Patient Active Problem List   Diagnosis    ADHD (attention deficit hyperactivity disorder)    Migraine with aura    OCD (obsessive compulsive disorder)    Tourette syndrome    Anxiety       GBS Status:   Group B Strep Culture   Date Value Ref Range Status   2023 Streptococcus agalactiae (Group B) (A)  Final     Comment:       This organism is considered to be universally susceptible to penicillin.  No further antibiotic testing will be performed. If Clindamycin or Erythromycin is the drug of choice, notify the laboratory within 7 days to request susceptibility testing.         No Known Allergies       Flu Status: Declines  Her Delivery Plan is:  IOL on 10/16/23 at 9:30pm     US today: no  Non Stress Test: No.    ROS -   Patient Denies: Loss of Fluid, Vaginal Spotting, Vision Changes, Headaches, Nausea , Vomiting , Contractions, and Epigastric pain  Fetal Movement : normal  All other systems reviewed and are negative.       The additional following portions of the patient's history were reviewed and updated as appropriate: allergies, current medications, past family history, past medical history, past social history, past surgical history, and problem list.    I have reviewed and agree with the HPI, ROS, and historical information as entered above. Tanya Dan, APRN        EXAM:     Prenatal Vitals  BP: 118/74  Weight: 88.9 kg (196 lb)   Fetal Heart Rate: 166              Urine Glucose Read-only: Negative  Urine Protein Read-only: (!) Trace           Assessment and Plan    Problem List Items Addressed This Visit    None  Visit Diagnoses       Prenatal care, antepartum    -  Primary    Relevant Orders    POC Urinalysis Dipstick (Completed)             Pregnancy at 38w0d  Fetal status reassuring.   Reviewed Pre-eclampsia signs/symptoms  GBS pos - Discussed need for prophylaxis during labor.  She is not allergic to PCN  Reviewed upcoming IOL with patient. Instructions given.  Signs of labor reviewed  Kick counts reviewed  Activity and Exercise discussed.  Return if symptoms worsen or fail to improve.    Tanya Dan, APRN  10/09/2023

## 2023-10-16 ENCOUNTER — PREP FOR SURGERY (OUTPATIENT)
Dept: OTHER | Facility: HOSPITAL | Age: 23
End: 2023-10-16
Payer: COMMERCIAL

## 2023-10-16 DIAGNOSIS — Z3A.39 39 WEEKS GESTATION OF PREGNANCY: Primary | ICD-10-CM

## 2023-10-16 RX ORDER — ACETAMINOPHEN 325 MG/1
650 TABLET ORAL EVERY 4 HOURS PRN
Status: CANCELLED | OUTPATIENT
Start: 2023-10-16

## 2023-10-16 RX ORDER — OXYTOCIN/0.9 % SODIUM CHLORIDE 30/500 ML
999 PLASTIC BAG, INJECTION (ML) INTRAVENOUS ONCE
Status: CANCELLED | OUTPATIENT
Start: 2023-10-16 | End: 2023-10-16

## 2023-10-16 RX ORDER — SODIUM CHLORIDE 0.9 % (FLUSH) 0.9 %
10 SYRINGE (ML) INJECTION AS NEEDED
Status: CANCELLED | OUTPATIENT
Start: 2023-10-16

## 2023-10-16 RX ORDER — HYDROCODONE BITARTRATE AND ACETAMINOPHEN 10; 325 MG/1; MG/1
1 TABLET ORAL EVERY 4 HOURS PRN
Status: CANCELLED | OUTPATIENT
Start: 2023-10-16 | End: 2023-10-26

## 2023-10-16 RX ORDER — MISOPROSTOL 200 UG/1
800 TABLET ORAL AS NEEDED
Status: CANCELLED | OUTPATIENT
Start: 2023-10-16

## 2023-10-16 RX ORDER — ONDANSETRON 4 MG/1
4 TABLET, FILM COATED ORAL EVERY 6 HOURS PRN
Status: CANCELLED | OUTPATIENT
Start: 2023-10-16

## 2023-10-16 RX ORDER — METHYLERGONOVINE MALEATE 0.2 MG/ML
200 INJECTION INTRAVENOUS ONCE AS NEEDED
Status: CANCELLED | OUTPATIENT
Start: 2023-10-16

## 2023-10-16 RX ORDER — OXYTOCIN/0.9 % SODIUM CHLORIDE 30/500 ML
250 PLASTIC BAG, INJECTION (ML) INTRAVENOUS CONTINUOUS
Status: CANCELLED | OUTPATIENT
Start: 2023-10-16 | End: 2023-10-16

## 2023-10-16 RX ORDER — ONDANSETRON 2 MG/ML
4 INJECTION INTRAMUSCULAR; INTRAVENOUS EVERY 6 HOURS PRN
Status: CANCELLED | OUTPATIENT
Start: 2023-10-16

## 2023-10-16 RX ORDER — CARBOPROST TROMETHAMINE 250 UG/ML
250 INJECTION, SOLUTION INTRAMUSCULAR AS NEEDED
Status: CANCELLED | OUTPATIENT
Start: 2023-10-16

## 2023-10-16 RX ORDER — MAGNESIUM CARB/ALUMINUM HYDROX 105-160MG
30 TABLET,CHEWABLE ORAL ONCE
Status: CANCELLED | OUTPATIENT
Start: 2023-10-16 | End: 2023-10-16

## 2023-10-16 RX ORDER — IBUPROFEN 600 MG/1
600 TABLET ORAL EVERY 6 HOURS PRN
Status: CANCELLED | OUTPATIENT
Start: 2023-10-16

## 2023-10-16 RX ORDER — PENICILLIN G 3000000 [IU]/50ML
3 INJECTION, SOLUTION INTRAVENOUS EVERY 4 HOURS
Status: CANCELLED | OUTPATIENT
Start: 2023-10-16 | End: 2023-10-18

## 2023-10-16 RX ORDER — SODIUM CHLORIDE, SODIUM LACTATE, POTASSIUM CHLORIDE, CALCIUM CHLORIDE 600; 310; 30; 20 MG/100ML; MG/100ML; MG/100ML; MG/100ML
125 INJECTION, SOLUTION INTRAVENOUS CONTINUOUS
Status: CANCELLED | OUTPATIENT
Start: 2023-10-16

## 2023-10-16 RX ORDER — SODIUM CHLORIDE 0.9 % (FLUSH) 0.9 %
10 SYRINGE (ML) INJECTION EVERY 12 HOURS SCHEDULED
Status: CANCELLED | OUTPATIENT
Start: 2023-10-16

## 2023-10-16 RX ORDER — LIDOCAINE HYDROCHLORIDE 10 MG/ML
0.5 INJECTION, SOLUTION EPIDURAL; INFILTRATION; INTRACAUDAL; PERINEURAL ONCE AS NEEDED
Status: CANCELLED | OUTPATIENT
Start: 2023-10-16

## 2023-10-16 RX ORDER — SODIUM CHLORIDE 9 MG/ML
40 INJECTION, SOLUTION INTRAVENOUS AS NEEDED
Status: CANCELLED | OUTPATIENT
Start: 2023-10-16

## 2023-10-16 RX ORDER — OXYTOCIN/0.9 % SODIUM CHLORIDE 30/500 ML
2-20 PLASTIC BAG, INJECTION (ML) INTRAVENOUS
Status: CANCELLED | OUTPATIENT
Start: 2023-10-16

## 2023-10-17 ENCOUNTER — HOSPITAL ENCOUNTER (INPATIENT)
Facility: HOSPITAL | Age: 23
LOS: 3 days | Discharge: HOME | End: 2023-10-20
Attending: OBSTETRICS & GYNECOLOGY | Admitting: OBSTETRICS & GYNECOLOGY
Payer: COMMERCIAL

## 2023-10-17 DIAGNOSIS — Z3A.39 39 WEEKS GESTATION OF PREGNANCY: ICD-10-CM

## 2023-10-17 DIAGNOSIS — Z34.90 PRENATAL CARE, ANTEPARTUM: ICD-10-CM

## 2023-10-17 LAB
ABO GROUP BLD: NORMAL
ABO GROUP BLD: NORMAL
BLD GP AB SCN SERPL QL: NEGATIVE
DEPRECATED RDW RBC AUTO: 48 FL (ref 37–54)
ERYTHROCYTE [DISTWIDTH] IN BLOOD BY AUTOMATED COUNT: 12.9 % (ref 12.3–15.4)
HCT VFR BLD AUTO: 32.1 % (ref 34–46.6)
HGB BLD-MCNC: 11.4 G/DL (ref 12–15.9)
MCH RBC QN AUTO: 36.2 PG (ref 26.6–33)
MCHC RBC AUTO-ENTMCNC: 35.5 G/DL (ref 31.5–35.7)
MCV RBC AUTO: 101.9 FL (ref 79–97)
PLATELET # BLD AUTO: 137 10*3/MM3 (ref 140–450)
PMV BLD AUTO: 11.5 FL (ref 6–12)
RBC # BLD AUTO: 3.15 10*6/MM3 (ref 3.77–5.28)
RH BLD: NEGATIVE
RH BLD: NEGATIVE
T&S EXPIRATION DATE: NORMAL
WBC NRBC COR # BLD: 9.27 10*3/MM3 (ref 3.4–10.8)

## 2023-10-17 PROCEDURE — 25010000002 PENICILLIN G POTASSIUM PER 600000 UNITS: Performed by: OBSTETRICS & GYNECOLOGY

## 2023-10-17 PROCEDURE — 25810000003 LACTATED RINGERS PER 1000 ML: Performed by: OBSTETRICS & GYNECOLOGY

## 2023-10-17 PROCEDURE — 86901 BLOOD TYPING SEROLOGIC RH(D): CPT | Performed by: NURSE PRACTITIONER

## 2023-10-17 PROCEDURE — 59025 FETAL NON-STRESS TEST: CPT

## 2023-10-17 PROCEDURE — 86901 BLOOD TYPING SEROLOGIC RH(D): CPT

## 2023-10-17 PROCEDURE — 3E033VJ INTRODUCTION OF OTHER HORMONE INTO PERIPHERAL VEIN, PERCUTANEOUS APPROACH: ICD-10-PCS | Performed by: OBSTETRICS & GYNECOLOGY

## 2023-10-17 PROCEDURE — 86900 BLOOD TYPING SEROLOGIC ABO: CPT

## 2023-10-17 PROCEDURE — 86900 BLOOD TYPING SEROLOGIC ABO: CPT | Performed by: NURSE PRACTITIONER

## 2023-10-17 PROCEDURE — 86850 RBC ANTIBODY SCREEN: CPT | Performed by: NURSE PRACTITIONER

## 2023-10-17 PROCEDURE — 85027 COMPLETE CBC AUTOMATED: CPT | Performed by: NURSE PRACTITIONER

## 2023-10-17 PROCEDURE — 59200 INSERT CERVICAL DILATOR: CPT | Performed by: OBSTETRICS & GYNECOLOGY

## 2023-10-17 RX ORDER — SODIUM CHLORIDE 0.9 % (FLUSH) 0.9 %
10 SYRINGE (ML) INJECTION EVERY 12 HOURS SCHEDULED
Status: DISCONTINUED | OUTPATIENT
Start: 2023-10-17 | End: 2023-10-17

## 2023-10-17 RX ORDER — MISOPROSTOL 200 UG/1
800 TABLET ORAL AS NEEDED
Status: DISCONTINUED | OUTPATIENT
Start: 2023-10-17 | End: 2023-10-18 | Stop reason: HOSPADM

## 2023-10-17 RX ORDER — MISOPROSTOL 200 UG/1
800 TABLET ORAL AS NEEDED
Status: DISCONTINUED | OUTPATIENT
Start: 2023-10-17 | End: 2023-10-17

## 2023-10-17 RX ORDER — METHYLERGONOVINE MALEATE 0.2 MG/ML
200 INJECTION INTRAVENOUS ONCE AS NEEDED
Status: DISCONTINUED | OUTPATIENT
Start: 2023-10-17 | End: 2023-10-18 | Stop reason: HOSPADM

## 2023-10-17 RX ORDER — ACETAMINOPHEN 325 MG/1
650 TABLET ORAL EVERY 4 HOURS PRN
Status: DISCONTINUED | OUTPATIENT
Start: 2023-10-17 | End: 2023-10-18 | Stop reason: HOSPADM

## 2023-10-17 RX ORDER — SODIUM CHLORIDE 0.9 % (FLUSH) 0.9 %
10 SYRINGE (ML) INJECTION AS NEEDED
Status: DISCONTINUED | OUTPATIENT
Start: 2023-10-17 | End: 2023-10-18 | Stop reason: HOSPADM

## 2023-10-17 RX ORDER — CARBOPROST TROMETHAMINE 250 UG/ML
250 INJECTION, SOLUTION INTRAMUSCULAR AS NEEDED
Status: DISCONTINUED | OUTPATIENT
Start: 2023-10-17 | End: 2023-10-17

## 2023-10-17 RX ORDER — PENICILLIN G 3000000 [IU]/50ML
3 INJECTION, SOLUTION INTRAVENOUS EVERY 4 HOURS
Status: DISCONTINUED | OUTPATIENT
Start: 2023-10-17 | End: 2023-10-18

## 2023-10-17 RX ORDER — ONDANSETRON 4 MG/1
4 TABLET, FILM COATED ORAL EVERY 6 HOURS PRN
Status: DISCONTINUED | OUTPATIENT
Start: 2023-10-17 | End: 2023-10-17

## 2023-10-17 RX ORDER — SODIUM CHLORIDE, SODIUM LACTATE, POTASSIUM CHLORIDE, CALCIUM CHLORIDE 600; 310; 30; 20 MG/100ML; MG/100ML; MG/100ML; MG/100ML
125 INJECTION, SOLUTION INTRAVENOUS CONTINUOUS
Status: DISCONTINUED | OUTPATIENT
Start: 2023-10-17 | End: 2023-10-17

## 2023-10-17 RX ORDER — ACETAMINOPHEN 325 MG/1
650 TABLET ORAL EVERY 4 HOURS PRN
Status: DISCONTINUED | OUTPATIENT
Start: 2023-10-17 | End: 2023-10-17

## 2023-10-17 RX ORDER — SODIUM CHLORIDE, SODIUM LACTATE, POTASSIUM CHLORIDE, CALCIUM CHLORIDE 600; 310; 30; 20 MG/100ML; MG/100ML; MG/100ML; MG/100ML
125 INJECTION, SOLUTION INTRAVENOUS CONTINUOUS
Status: DISCONTINUED | OUTPATIENT
Start: 2023-10-17 | End: 2023-10-19

## 2023-10-17 RX ORDER — HYDROCODONE BITARTRATE AND ACETAMINOPHEN 5; 325 MG/1; MG/1
1 TABLET ORAL EVERY 4 HOURS PRN
Status: DISCONTINUED | OUTPATIENT
Start: 2023-10-17 | End: 2023-10-18 | Stop reason: HOSPADM

## 2023-10-17 RX ORDER — LIDOCAINE HYDROCHLORIDE 10 MG/ML
0.5 INJECTION, SOLUTION EPIDURAL; INFILTRATION; INTRACAUDAL; PERINEURAL ONCE AS NEEDED
Status: DISCONTINUED | OUTPATIENT
Start: 2023-10-17 | End: 2023-10-17

## 2023-10-17 RX ORDER — TERBUTALINE SULFATE 1 MG/ML
0.25 INJECTION, SOLUTION SUBCUTANEOUS AS NEEDED
Status: DISCONTINUED | OUTPATIENT
Start: 2023-10-17 | End: 2023-10-18 | Stop reason: HOSPADM

## 2023-10-17 RX ORDER — HYDROCODONE BITARTRATE AND ACETAMINOPHEN 10; 325 MG/1; MG/1
1 TABLET ORAL EVERY 4 HOURS PRN
Status: DISCONTINUED | OUTPATIENT
Start: 2023-10-17 | End: 2023-10-18 | Stop reason: HOSPADM

## 2023-10-17 RX ORDER — SODIUM CHLORIDE 9 MG/ML
40 INJECTION, SOLUTION INTRAVENOUS AS NEEDED
Status: DISCONTINUED | OUTPATIENT
Start: 2023-10-17 | End: 2023-10-17

## 2023-10-17 RX ORDER — OXYTOCIN/0.9 % SODIUM CHLORIDE 30/500 ML
2-20 PLASTIC BAG, INJECTION (ML) INTRAVENOUS
Status: DISCONTINUED | OUTPATIENT
Start: 2023-10-17 | End: 2023-10-17 | Stop reason: SDUPTHER

## 2023-10-17 RX ORDER — OXYTOCIN/0.9 % SODIUM CHLORIDE 30/500 ML
250 PLASTIC BAG, INJECTION (ML) INTRAVENOUS CONTINUOUS
Status: ACTIVE | OUTPATIENT
Start: 2023-10-17 | End: 2023-10-17

## 2023-10-17 RX ORDER — CARBOPROST TROMETHAMINE 250 UG/ML
250 INJECTION, SOLUTION INTRAMUSCULAR AS NEEDED
Status: DISCONTINUED | OUTPATIENT
Start: 2023-10-17 | End: 2023-10-18 | Stop reason: HOSPADM

## 2023-10-17 RX ORDER — ONDANSETRON 4 MG/1
4 TABLET, FILM COATED ORAL EVERY 6 HOURS PRN
Status: DISCONTINUED | OUTPATIENT
Start: 2023-10-17 | End: 2023-10-18 | Stop reason: HOSPADM

## 2023-10-17 RX ORDER — HYDROCODONE BITARTRATE AND ACETAMINOPHEN 10; 325 MG/1; MG/1
1 TABLET ORAL EVERY 4 HOURS PRN
Status: DISCONTINUED | OUTPATIENT
Start: 2023-10-17 | End: 2023-10-17

## 2023-10-17 RX ORDER — OXYTOCIN/0.9 % SODIUM CHLORIDE 30/500 ML
250 PLASTIC BAG, INJECTION (ML) INTRAVENOUS CONTINUOUS
Status: DISCONTINUED | OUTPATIENT
Start: 2023-10-17 | End: 2023-10-17 | Stop reason: SDUPTHER

## 2023-10-17 RX ORDER — ONDANSETRON 2 MG/ML
4 INJECTION INTRAMUSCULAR; INTRAVENOUS EVERY 6 HOURS PRN
Status: DISCONTINUED | OUTPATIENT
Start: 2023-10-17 | End: 2023-10-17

## 2023-10-17 RX ORDER — OXYTOCIN/0.9 % SODIUM CHLORIDE 30/500 ML
2-20 PLASTIC BAG, INJECTION (ML) INTRAVENOUS
Status: DISCONTINUED | OUTPATIENT
Start: 2023-10-17 | End: 2023-10-18 | Stop reason: HOSPADM

## 2023-10-17 RX ORDER — SODIUM CHLORIDE 0.9 % (FLUSH) 0.9 %
10 SYRINGE (ML) INJECTION AS NEEDED
Status: DISCONTINUED | OUTPATIENT
Start: 2023-10-17 | End: 2023-10-17

## 2023-10-17 RX ORDER — SODIUM CHLORIDE 9 MG/ML
40 INJECTION, SOLUTION INTRAVENOUS AS NEEDED
Status: DISCONTINUED | OUTPATIENT
Start: 2023-10-17 | End: 2023-10-18 | Stop reason: HOSPADM

## 2023-10-17 RX ORDER — IBUPROFEN 600 MG/1
600 TABLET ORAL EVERY 6 HOURS PRN
Status: DISCONTINUED | OUTPATIENT
Start: 2023-10-17 | End: 2023-10-18 | Stop reason: HOSPADM

## 2023-10-17 RX ORDER — MAGNESIUM CARB/ALUMINUM HYDROX 105-160MG
30 TABLET,CHEWABLE ORAL ONCE
Status: DISCONTINUED | OUTPATIENT
Start: 2023-10-17 | End: 2023-10-18 | Stop reason: HOSPADM

## 2023-10-17 RX ORDER — ONDANSETRON 2 MG/ML
4 INJECTION INTRAMUSCULAR; INTRAVENOUS EVERY 6 HOURS PRN
Status: DISCONTINUED | OUTPATIENT
Start: 2023-10-17 | End: 2023-10-18 | Stop reason: HOSPADM

## 2023-10-17 RX ORDER — MAGNESIUM CARB/ALUMINUM HYDROX 105-160MG
30 TABLET,CHEWABLE ORAL ONCE
Status: DISCONTINUED | OUTPATIENT
Start: 2023-10-17 | End: 2023-10-17

## 2023-10-17 RX ORDER — METHYLERGONOVINE MALEATE 0.2 MG/ML
200 INJECTION INTRAVENOUS ONCE AS NEEDED
Status: DISCONTINUED | OUTPATIENT
Start: 2023-10-17 | End: 2023-10-17

## 2023-10-17 RX ORDER — IBUPROFEN 600 MG/1
600 TABLET ORAL EVERY 6 HOURS PRN
Status: DISCONTINUED | OUTPATIENT
Start: 2023-10-17 | End: 2023-10-17

## 2023-10-17 RX ORDER — LIDOCAINE HYDROCHLORIDE 10 MG/ML
0.5 INJECTION, SOLUTION EPIDURAL; INFILTRATION; INTRACAUDAL; PERINEURAL ONCE AS NEEDED
Status: DISCONTINUED | OUTPATIENT
Start: 2023-10-17 | End: 2023-10-18 | Stop reason: HOSPADM

## 2023-10-17 RX ORDER — OXYTOCIN/0.9 % SODIUM CHLORIDE 30/500 ML
999 PLASTIC BAG, INJECTION (ML) INTRAVENOUS ONCE
Status: COMPLETED | OUTPATIENT
Start: 2023-10-17 | End: 2023-10-18

## 2023-10-17 RX ORDER — OXYTOCIN/0.9 % SODIUM CHLORIDE 30/500 ML
999 PLASTIC BAG, INJECTION (ML) INTRAVENOUS ONCE
Status: DISCONTINUED | OUTPATIENT
Start: 2023-10-17 | End: 2023-10-17 | Stop reason: SDUPTHER

## 2023-10-17 RX ORDER — SODIUM CHLORIDE 0.9 % (FLUSH) 0.9 %
10 SYRINGE (ML) INJECTION EVERY 12 HOURS SCHEDULED
Status: DISCONTINUED | OUTPATIENT
Start: 2023-10-17 | End: 2023-10-18 | Stop reason: HOSPADM

## 2023-10-17 RX ADMIN — Medication 2 MILLI-UNITS/MIN: at 18:27

## 2023-10-17 RX ADMIN — SODIUM CHLORIDE 5 MILLION UNITS: 900 INJECTION INTRAVENOUS at 17:57

## 2023-10-17 RX ADMIN — PENICILLIN G 3 MILLION UNITS: 3000000 INJECTION, SOLUTION INTRAVENOUS at 21:06

## 2023-10-17 RX ADMIN — SODIUM CHLORIDE, POTASSIUM CHLORIDE, SODIUM LACTATE AND CALCIUM CHLORIDE 125 ML/HR: 600; 310; 30; 20 INJECTION, SOLUTION INTRAVENOUS at 16:44

## 2023-10-17 NOTE — H&P
"History and Physical  [facility]    Chief Complaint   Patient presents with    Scheduled Induction         Pregnant       Sis Lerma is a 23 y.o. year old  with an Estimated Date of Delivery: 10/23/23 currently at 39w1d presenting with no complaints.    Prenatal care has been with Dr. Tierney.  It has been significant for uncomplicated.    The following portions of the patient's history were reviewed and updated as appropriate:vital signs, allergies, current medications, past medical history, past social history, past surgical history, and problem list.    Review of Systems  Pertinent items are noted in HPI.     Objective     /67 (BP Location: Right arm, Patient Position: Lying)   Pulse 72   Temp 98.1 °F (36.7 °C) (Oral)   Resp 18   Ht 160 cm (63\")   Wt 88.9 kg (196 lb)   LMP  (Approximate)   Breastfeeding Yes   BMI 34.72 kg/m²     Physical Exam    General:  well developed; well nourished  no acute distress           Abdomen: soft, non-tender; no masses  no umbilical or inguinal hernias are present  no hepato-splenomegaly       FHT's: reactive and category 1   Cervix:    [place]: Contraction are irregular     Lab Review   Labs: No data reviewed   Lab Results (last 24 hours)       Procedure Component Value Units Date/Time    CBC (No Diff) [[id number]]  (Abnormal) Collected: 10/17/23 1645    Specimen: Blood from Arm, Right Updated: 10/17/23 1700     WBC 9.27 10*3/mm3      RBC 3.15 10*6/mm3      Hemoglobin 11.4 g/dL      Hematocrit 32.1 %      .9 fL      MCH 36.2 pg      MCHC 35.5 g/dL      RDW 12.9 %      RDW-SD 48.0 fl      MPV 11.5 fL      Platelets 137 10*3/mm3             Imaging   No data reviewed   Imaging Results (Most Recent)       None          Assessment & Plan     ASSESSMENT  IUP at 39w1d  Desires risk reducing induction  Rh negative  GBS positive    PLAN  Admit for cervical ripening and induction of labor.         Maddison Tierney MD  10/17/385791:07 " EDT

## 2023-10-17 NOTE — PROCEDURES
"Patient admitted for elective induction of labor at 39 weeks 1 day gestation.  Received request for placement of transcervical Lyons bulb for cervical ripening.  Cervical examination: Fingertip/40%/-3 station (cervix posterior and medium texture).  Cephalic presentation confirmed by bedside ultrasound.  Transcervical Lynos placed per physician request.  FHT's class 1.  Patient tolerated well.  24 Kazakh, 60ml.    Seyd Sea \"Barlow\" ALAN Palomino MD  10/17/2023  17:43 EDT      "

## 2023-10-18 ENCOUNTER — ANESTHESIA (OUTPATIENT)
Dept: LABOR AND DELIVERY | Facility: HOSPITAL | Age: 23
End: 2023-10-18
Payer: COMMERCIAL

## 2023-10-18 ENCOUNTER — ANESTHESIA EVENT (OUTPATIENT)
Dept: LABOR AND DELIVERY | Facility: HOSPITAL | Age: 23
End: 2023-10-18
Payer: COMMERCIAL

## 2023-10-18 PROBLEM — Z34.90 PREGNANT: Status: RESOLVED | Noted: 2023-10-17 | Resolved: 2023-10-18

## 2023-10-18 LAB
ABO GROUP BLD: NORMAL
FETAL BLEED: NEGATIVE
NUMBER OF DOSES: NORMAL
RH BLD: NEGATIVE

## 2023-10-18 PROCEDURE — 86900 BLOOD TYPING SEROLOGIC ABO: CPT | Performed by: OBSTETRICS & GYNECOLOGY

## 2023-10-18 PROCEDURE — 59025 FETAL NON-STRESS TEST: CPT

## 2023-10-18 PROCEDURE — 85461 HEMOGLOBIN FETAL: CPT | Performed by: OBSTETRICS & GYNECOLOGY

## 2023-10-18 PROCEDURE — 25010000002 ROPIVACAINE PER 1 MG: Performed by: ANESTHESIOLOGY

## 2023-10-18 PROCEDURE — 86901 BLOOD TYPING SEROLOGIC RH(D): CPT | Performed by: OBSTETRICS & GYNECOLOGY

## 2023-10-18 PROCEDURE — C1755 CATHETER, INTRASPINAL: HCPCS

## 2023-10-18 PROCEDURE — C1755 CATHETER, INTRASPINAL: HCPCS | Performed by: ANESTHESIOLOGY

## 2023-10-18 PROCEDURE — 25810000003 LACTATED RINGERS SOLUTION: Performed by: OBSTETRICS & GYNECOLOGY

## 2023-10-18 PROCEDURE — 59410 OBSTETRICAL CARE: CPT | Performed by: OBSTETRICS & GYNECOLOGY

## 2023-10-18 PROCEDURE — 25010000002 BUPIVACAINE (PF) 0.5 % SOLUTION: Performed by: ANESTHESIOLOGY

## 2023-10-18 PROCEDURE — 25010000002 PENICILLIN G POTASSIUM PER 600000 UNITS: Performed by: OBSTETRICS & GYNECOLOGY

## 2023-10-18 PROCEDURE — 25010000002 RHO D IMMUNE GLOBULIN 1500 UNIT/2ML SOLUTION PREFILLED SYRINGE: Performed by: OBSTETRICS & GYNECOLOGY

## 2023-10-18 RX ORDER — EPHEDRINE SULFATE 5 MG/ML
10 INJECTION INTRAVENOUS
Status: DISCONTINUED | OUTPATIENT
Start: 2023-10-18 | End: 2023-10-18 | Stop reason: HOSPADM

## 2023-10-18 RX ORDER — ONDANSETRON 2 MG/ML
4 INJECTION INTRAMUSCULAR; INTRAVENOUS ONCE AS NEEDED
Status: DISCONTINUED | OUTPATIENT
Start: 2023-10-18 | End: 2023-10-18 | Stop reason: HOSPADM

## 2023-10-18 RX ORDER — DIPHENHYDRAMINE HYDROCHLORIDE 50 MG/ML
12.5 INJECTION INTRAMUSCULAR; INTRAVENOUS EVERY 8 HOURS PRN
Status: DISCONTINUED | OUTPATIENT
Start: 2023-10-18 | End: 2023-10-18 | Stop reason: HOSPADM

## 2023-10-18 RX ORDER — DOCUSATE SODIUM 100 MG/1
100 CAPSULE, LIQUID FILLED ORAL 2 TIMES DAILY
Status: DISCONTINUED | OUTPATIENT
Start: 2023-10-18 | End: 2023-10-20 | Stop reason: HOSPADM

## 2023-10-18 RX ORDER — ACETAMINOPHEN 325 MG/1
650 TABLET ORAL EVERY 6 HOURS PRN
Status: DISCONTINUED | OUTPATIENT
Start: 2023-10-18 | End: 2023-10-20 | Stop reason: HOSPADM

## 2023-10-18 RX ORDER — METOCLOPRAMIDE HYDROCHLORIDE 5 MG/ML
10 INJECTION INTRAMUSCULAR; INTRAVENOUS ONCE AS NEEDED
Status: DISCONTINUED | OUTPATIENT
Start: 2023-10-18 | End: 2023-10-18 | Stop reason: HOSPADM

## 2023-10-18 RX ORDER — ROPIVACAINE HYDROCHLORIDE 2 MG/ML
15 INJECTION, SOLUTION EPIDURAL; INFILTRATION; PERINEURAL CONTINUOUS
Status: DISCONTINUED | OUTPATIENT
Start: 2023-10-18 | End: 2023-10-19

## 2023-10-18 RX ORDER — HYDROCODONE BITARTRATE AND ACETAMINOPHEN 10; 325 MG/1; MG/1
1 TABLET ORAL EVERY 4 HOURS PRN
Status: DISCONTINUED | OUTPATIENT
Start: 2023-10-18 | End: 2023-10-20 | Stop reason: HOSPADM

## 2023-10-18 RX ORDER — LIDOCAINE HYDROCHLORIDE AND EPINEPHRINE 15; 5 MG/ML; UG/ML
INJECTION, SOLUTION EPIDURAL AS NEEDED
Status: DISCONTINUED | OUTPATIENT
Start: 2023-10-18 | End: 2023-10-18 | Stop reason: SURG

## 2023-10-18 RX ORDER — CITRIC ACID/SODIUM CITRATE 334-500MG
30 SOLUTION, ORAL ORAL ONCE
Status: DISCONTINUED | OUTPATIENT
Start: 2023-10-18 | End: 2023-10-18 | Stop reason: HOSPADM

## 2023-10-18 RX ORDER — SODIUM CHLORIDE 0.9 % (FLUSH) 0.9 %
3 SYRINGE (ML) INJECTION AS NEEDED
Status: DISCONTINUED | OUTPATIENT
Start: 2023-10-18 | End: 2023-10-19

## 2023-10-18 RX ORDER — HYDROCORTISONE 25 MG/G
1 CREAM TOPICAL AS NEEDED
Status: DISCONTINUED | OUTPATIENT
Start: 2023-10-18 | End: 2023-10-20 | Stop reason: HOSPADM

## 2023-10-18 RX ORDER — FAMOTIDINE 10 MG/ML
20 INJECTION, SOLUTION INTRAVENOUS ONCE AS NEEDED
Status: DISCONTINUED | OUTPATIENT
Start: 2023-10-18 | End: 2023-10-18 | Stop reason: HOSPADM

## 2023-10-18 RX ORDER — IBUPROFEN 600 MG/1
600 TABLET ORAL EVERY 6 HOURS PRN
Status: DISCONTINUED | OUTPATIENT
Start: 2023-10-18 | End: 2023-10-20 | Stop reason: HOSPADM

## 2023-10-18 RX ORDER — SODIUM CHLORIDE 0.9 % (FLUSH) 0.9 %
1-10 SYRINGE (ML) INJECTION AS NEEDED
Status: DISCONTINUED | OUTPATIENT
Start: 2023-10-18 | End: 2023-10-20 | Stop reason: HOSPADM

## 2023-10-18 RX ORDER — BUPIVACAINE HYDROCHLORIDE 5 MG/ML
INJECTION, SOLUTION EPIDURAL; INTRACAUDAL AS NEEDED
Status: DISCONTINUED | OUTPATIENT
Start: 2023-10-18 | End: 2023-10-18 | Stop reason: SURG

## 2023-10-18 RX ORDER — BISACODYL 10 MG
10 SUPPOSITORY, RECTAL RECTAL DAILY PRN
Status: DISCONTINUED | OUTPATIENT
Start: 2023-10-19 | End: 2023-10-20 | Stop reason: HOSPADM

## 2023-10-18 RX ORDER — OXYTOCIN/0.9 % SODIUM CHLORIDE 30/500 ML
125 PLASTIC BAG, INJECTION (ML) INTRAVENOUS CONTINUOUS PRN
Status: DISCONTINUED | OUTPATIENT
Start: 2023-10-18 | End: 2023-10-20 | Stop reason: HOSPADM

## 2023-10-18 RX ORDER — HYDROCODONE BITARTRATE AND ACETAMINOPHEN 5; 325 MG/1; MG/1
1 TABLET ORAL EVERY 4 HOURS PRN
Status: DISCONTINUED | OUTPATIENT
Start: 2023-10-18 | End: 2023-10-20 | Stop reason: HOSPADM

## 2023-10-18 RX ORDER — DIPHENHYDRAMINE HCL 25 MG
25 CAPSULE ORAL NIGHTLY PRN
Status: DISCONTINUED | OUTPATIENT
Start: 2023-10-18 | End: 2023-10-20 | Stop reason: HOSPADM

## 2023-10-18 RX ADMIN — LIDOCAINE HYDROCHLORIDE AND EPINEPHRINE 3 ML: 15; 5 INJECTION, SOLUTION EPIDURAL at 04:44

## 2023-10-18 RX ADMIN — PENICILLIN G 3 MILLION UNITS: 3000000 INJECTION, SOLUTION INTRAVENOUS at 01:31

## 2023-10-18 RX ADMIN — HYDROCORTISONE 2.5% 1 APPLICATION: 25 CREAM TOPICAL at 16:39

## 2023-10-18 RX ADMIN — WITCH HAZEL: 500 SOLUTION RECTAL; TOPICAL at 16:39

## 2023-10-18 RX ADMIN — Medication 3 ML: at 18:34

## 2023-10-18 RX ADMIN — PENICILLIN G 3 MILLION UNITS: 3000000 INJECTION, SOLUTION INTRAVENOUS at 04:34

## 2023-10-18 RX ADMIN — IBUPROFEN 600 MG: 600 TABLET, FILM COATED ORAL at 16:39

## 2023-10-18 RX ADMIN — Medication 999 ML/HR: at 12:48

## 2023-10-18 RX ADMIN — ACETAMINOPHEN 650 MG: 325 TABLET ORAL at 18:44

## 2023-10-18 RX ADMIN — ROPIVACAINE HYDROCHLORIDE 15 ML/HR: 2 INJECTION, SOLUTION EPIDURAL; INFILTRATION; PERINEURAL at 04:44

## 2023-10-18 RX ADMIN — Medication: at 16:39

## 2023-10-18 RX ADMIN — HUMAN RHO(D) IMMUNE GLOBULIN 1500 UNITS: 1500 SOLUTION INTRAMUSCULAR; INTRAVENOUS at 18:34

## 2023-10-18 RX ADMIN — PENICILLIN G 3 MILLION UNITS: 3000000 INJECTION, SOLUTION INTRAVENOUS at 09:53

## 2023-10-18 RX ADMIN — BUPIVACAINE HYDROCHLORIDE 6 ML: 5 INJECTION, SOLUTION EPIDURAL; INTRACAUDAL at 04:44

## 2023-10-18 RX ADMIN — SODIUM CHLORIDE, POTASSIUM CHLORIDE, SODIUM LACTATE AND CALCIUM CHLORIDE 1000 ML: 600; 310; 30; 20 INJECTION, SOLUTION INTRAVENOUS at 04:18

## 2023-10-18 NOTE — SIGNIFICANT NOTE
10/18/23 1532   Maternal Information   Date of Referral 10/18/23   Person Making Referral lactation consultant   Maternal Reason for Referral no prior breastfeeding experience  (Pt states she would like to breastfeed for about 2 weeks then pump/bottle feed. Educated on supply/demand along with pumping q3 hrs once she starts to pump & supp)   Maternal Assessment   Breast Size Issue none   Breast Shape Bilateral:;round   Breast Density Bilateral:;soft   Nipples Bilateral:;everted   Left Nipple Symptoms intact;nontender   Right Nipple Symptoms intact;nontender   Maternal Infant Feeding   Maternal Emotional State receptive;relaxed   Infant Positioning clutch/football  (Left)   Signs of Milk Transfer deep jaw excursions noted;audible swallow  (very good breastfeeder)   Pain with Feeding no   Comfort Measures Before/During Feeding suction broken using finger;maternal position adjusted;latch adjusted;infant position adjusted   Latch Assistance   (pt took over after demonstration given.)   Support Person Involvement actively supporting mother   Milk Expression/Equipment   Breast Pump Type double electric, personal  (pt would like to get the Spectra pump)   Breast Pumping   Breast Pumping Interventions   (to pump for short or missed feedings.)

## 2023-10-18 NOTE — PAYOR COMM NOTE
"Sis Weller Arlin (23 y.o. Female)     [employer] ID#[id number]    Delivery information:  Vaginal Delivery 10/18/23 @ 12:17  Wt 3585 gms  Male  Apgars 5/9    From:Nunu Guillen LPN, Utilization Review  Phone #[phone]  Fax #[phone]        Date of Birth   2000    Social Security Number       Address   [address] [place] [address]    Home Phone   [phone]    MRN   [id number]       Shinto   Mormon    Marital Status   Single                            Admission Date   10/17/23    Admission Type   Elective    Admitting Provider   Maddison Tierney MD    Attending Provider   Maddison Tierney MD    Department, Room/Bed   [facility] LABOR DELIVERY, N317/1       Discharge Date       Discharge Disposition       Discharge Destination                                 Attending Provider: Maddison Tierney MD    Allergies: No Known Allergies    Isolation: None   Infection: None   Code Status: CPR    Ht: 160 cm (63\")   Wt: 88.9 kg (196 lb)    Admission Cmt: None   Principal Problem: Pregnant [Z34.90]                   Active Insurance as of 10/17/2023       Primary Coverage       Payor Plan Insurance Group Employer/Plan Group    [facility] MEDICAID        Payor Plan Address Payor Plan Phone Number Payor Plan Fax Number Effective Dates    [address] [address] [phone]  2/4/2017 - None Entered    [facility] [id number]         Subscriber Name Subscriber Birth Date Member ID       SIS WELLER 2000 [id number]                     Emergency Contacts        (Rel.) Home Phone Work Phone Mobile Phone    Maria Del Carmen Thurman (Mother) [phone] -- --    Eleno Thurman (Father) [phone] -- --              Insurance Information                  [facility] MEDICAID Phone: [phone]    Subscriber: WellerSis birminghambeth Subscriber#: [id number]    Group#: -- Precert#: --             History & Physical        Maddison Tierney MD at " "10/17/23 190          History and Physical  [facility]    Chief Complaint   Patient presents with    Scheduled Induction         Pregnant       Sis Lerma is a 23 y.o. year old  with an Estimated Date of Delivery: 10/23/23 currently at 39w1d presenting with no complaints.    Prenatal care has been with Dr. Tierney.  It has been significant for uncomplicated.    The following portions of the patient's history were reviewed and updated as appropriate:vital signs, allergies, current medications, past medical history, past social history, past surgical history, and problem list.    Review of Systems  Pertinent items are noted in HPI.     Objective    /67 (BP Location: Right arm, Patient Position: Lying)   Pulse 72   Temp 98.1 °F (36.7 °C) (Oral)   Resp 18   Ht 160 cm (63\")   Wt 88.9 kg (196 lb)   LMP  (Approximate)   Breastfeeding Yes   BMI 34.72 kg/m²     Physical Exam    General:  well developed; well nourished  no acute distress           Abdomen: soft, non-tender; no masses  no umbilical or inguinal hernias are present  no hepato-splenomegaly       FHT's: reactive and category 1   Cervix:    [place]: Contraction are irregular     Lab Review   Labs: No data reviewed   Lab Results (last 24 hours)       Procedure Component Value Units Date/Time    CBC (No Diff) [[id number]]  (Abnormal) Collected: 10/17/23 1645    Specimen: Blood from Arm, Right Updated: 10/17/23 1700     WBC 9.27 10*3/mm3      RBC 3.15 10*6/mm3      Hemoglobin 11.4 g/dL      Hematocrit 32.1 %      .9 fL      MCH 36.2 pg      MCHC 35.5 g/dL      RDW 12.9 %      RDW-SD 48.0 fl      MPV 11.5 fL      Platelets 137 10*3/mm3             Imaging   No data reviewed   Imaging Results (Most Recent)       None          Assessment & Plan    ASSESSMENT  IUP at 39w1d  Desires risk reducing induction  Rh negative  GBS positive    PLAN  Admit for cervical ripening and induction of labor.         Maddison Tierney, " MD  10/17/774220:07 EDT    Electronically signed by Maddison Tierney MD at 10/17/23 1909       Facility-Administered Medications as of 10/18/2023   Medication Dose Route Frequency Provider Last Rate Last Admin    acetaminophen (TYLENOL) tablet 650 mg  650 mg Oral Q4H PRN Dan, DALY Lama        carboprost (HEMABATE) injection 250 mcg  250 mcg Intramuscular PRN DanTanya layne APRN        diphenhydrAMINE (BENADRYL) injection 12.5 mg  12.5 mg Intravenous Q8H PRN Trever Gomez MD        ePHEDrine Sulfate (Pressors) 5 MG/ML injection 10 mg  10 mg Intravenous Q10 Min PRN Trever Gomez MD        famotidine (PEPCID) injection 20 mg  20 mg Intravenous Once PRN Trever Gomez MD        HYDROcodone-acetaminophen (NORCO)  MG per tablet 1 tablet  1 tablet Oral Q4H PRN DanTanya layne APRN        HYDROcodone-acetaminophen (NORCO) 5-325 MG per tablet 1 tablet  1 tablet Oral Q4H PRN DanTanya APRN        ibuprofen (ADVIL,MOTRIN) tablet 600 mg  600 mg Oral Q6H PRN Dan, DALY Lama        lactated ringers bolus 1,000 mL  1,000 mL Intravenous Once PRN Tanya Dan APRN        [COMPLETED] lactated ringers bolus 1,000 mL  1,000 mL Intravenous Once Maddison Tierney MD 2,000 mL/hr at 10/18/23 0418 1,000 mL at 10/18/23 0418    lactated ringers bolus 1,000 mL  1,000 mL Intravenous PRN Trever Gomez MD        lactated ringers infusion  125 mL/hr Intravenous Continuous Maddison Tierney MD   Stopped at 10/18/23 1217    lidocaine PF 1% (XYLOCAINE) injection 0.5 mL  0.5 mL Intradermal Once PRN DanTanya delgado APRN        methylergonovine (METHERGINE) injection 200 mcg  200 mcg Intramuscular Once PRN DanTanya delgado APRN        metoclopramide (REGLAN) injection 10 mg  10 mg Intravenous Once PRN Trever Gomez MD        mineral oil liquid 30 mL  30 mL Topical Once Dan, DALY Lama        miSOPROStol (CYTOTEC) tablet 800 mcg  800 mcg  Vaginal PRN DanTanya APRN        ondansetron (ZOFRAN) tablet 4 mg  4 mg Oral Q6H PRN DanTanya layne APRN        Or    ondansetron (ZOFRAN) injection 4 mg  4 mg Intravenous Q6H PRN Dan, DALY Lama        ondansetron (ZOFRAN) injection 4 mg  4 mg Intravenous Once PRN Trever Gomez MD        [COMPLETED] oxytocin (PITOCIN) 30 units in 0.9% sodium chloride 500 mL (premix)  999 mL/hr Intravenous Once Maddison Tierney  mL/hr at 10/18/23 1248 999 mL/hr at 10/18/23 1248    Followed by    [] oxytocin (PITOCIN) 30 units in 0.9% sodium chloride 500 mL (premix)  250 mL/hr Intravenous Continuous Maddison Tierney MD        oxytocin (PITOCIN) 30 units in 0.9% sodium chloride 500 mL (premix)  2-20 thi-units/min Intravenous Titrated Maddison Tierney MD   Stopped at 10/18/23 1217    [COMPLETED] penicillin g 5 MU/100 mL 0.9% NS IVPB (mbp)  5 Million Units Intravenous Once Maddison Tierney MD   5 Million Units at 10/17/23 1757    ropivacaine (NAROPIN) 0.2 % injection  15 mL/hr Epidural Continuous Trever Gomez MD 15 mL/hr at 10/18/23 0444 15 mL/hr at 10/18/23 0444    Sod Citrate-Citric Acid (BICITRA) oral solution 30 mL  30 mL Oral Once Trever Gomez MD        sodium chloride 0.9 % flush 10 mL  10 mL Intravenous Q12H DanTanya APRN        sodium chloride 0.9 % flush 10 mL  10 mL Intravenous PRN Tanya Dan APRN        sodium chloride 0.9 % infusion 40 mL  40 mL Intravenous PRN Maddison Tierney MD        terbutaline (BRETHINE) injection 0.25 mg  0.25 mg Subcutaneous PRN DanTanya layne APRN         Orders (last 24 hrs)        Start     Ordered    10/18/23 1223  Transfer to postpartum when discharge criteria met.  Until Discontinued         10/18/23 1222    10/18/23 0515  ropivacaine (NAROPIN) 0.2 % injection  Continuous         10/18/23 0420    10/18/23 0515  Sod Citrate-Citric Acid (BICITRA) oral solution 30 mL  Once         10/18/23 0420     10/18/23 0421  Vital Signs Per Anesthesia Guidelines  Per Order Details        Comments: Every 3 Minutes x20 Minutes Following Epidural Dosing, Then Every 15 Minutes If Stable    10/18/23 0420    10/18/23 0421  Start IV with #16 or #18 gauge angiocath.  Once         10/18/23 0420    10/18/23 0421  Fetal Heart Rate Monitor  Once         10/18/23 0420    10/18/23 0421  Nurse or anesthesiologist to remain with patient for 15 minutes following dosing.  Until Discontinued         10/18/23 0420    10/18/23 0421  Facilitate maternal postion on side and maintain uterine displacement.  Until Discontinued         10/18/23 0420    10/18/23 0421  Consult anesthesia services prior to changing epidural infusion/rate.  Until Discontinued         10/18/23 0420    10/18/23 0421  Notify physician for the following conditions:  Until Discontinued         10/18/23 0420    10/18/23 0420  ePHEDrine Sulfate (Pressors) 5 MG/ML injection 10 mg  Every 10 Minutes PRN         10/18/23 0420    10/18/23 0420  metoclopramide (REGLAN) injection 10 mg  Once As Needed         10/18/23 0420    10/18/23 0420  ondansetron (ZOFRAN) injection 4 mg  Once As Needed         10/18/23 0420    10/18/23 0420  famotidine (PEPCID) injection 20 mg  Once As Needed         10/18/23 0420    10/18/23 0420  diphenhydrAMINE (BENADRYL) injection 12.5 mg  Every 8 Hours PRN         10/18/23 0420    10/18/23 0420  lactated ringers bolus 1,000 mL  As Needed         10/18/23 0420    10/17/23 2100  sodium chloride 0.9 % flush 10 mL  Every 12 Hours Scheduled         10/17/23 1611    10/17/23 2100  sodium chloride 0.9 % flush 10 mL  Every 12 Hours Scheduled,   Status:  Discontinued         10/17/23 1611    10/17/23 2100  penicillin G in iso-osmotic dextrose IVPB 3 million units (premix)  Every 4 Hours,   Status:  Discontinued        See Hyperspace for full Linked Orders Report.    10/17/23 1611    10/17/23 2000  Vital Signs q 4 while awake  Every 4 Hours      Comments: While the  patient is awake.    10/17/23 1611    10/17/23 2000  Vital Signs q 4 while awake  Every 4 Hours      Comments: While the patient is awake.    10/17/23 1611    10/17/23 1820  Case Management  Consult  Once        Provider:  (Not yet assigned)    10/17/23 1820    10/17/23 1715  oxytocin (PITOCIN) 30 units in 0.9% sodium chloride 500 mL (premix)  Continuous,   Status:  Discontinued        See Hyperspace for full Linked Orders Report.    10/17/23 1611    10/17/23 1715  oxytocin (PITOCIN) 30 units in 0.9% sodium chloride 500 mL (premix)  Continuous        See Hyperspace for full Linked Orders Report.    10/17/23 1611    10/17/23 1700  lactated ringers infusion  Continuous,   Status:  Discontinued         10/17/23 1611    10/17/23 1700  mineral oil liquid 30 mL  Once         10/17/23 1611    10/17/23 1700  oxytocin (PITOCIN) 30 units in 0.9% sodium chloride 500 mL (premix)  Once,   Status:  Discontinued        See Hyperspace for full Linked Orders Report.    10/17/23 1611    10/17/23 1700  oxytocin (PITOCIN) 30 units in 0.9% sodium chloride 500 mL (premix)  Titrated,   Status:  Discontinued         10/17/23 1611    10/17/23 1700  lactated ringers bolus 1,000 mL  Once         10/17/23 1611    10/17/23 1700  lactated ringers infusion  Continuous         10/17/23 1611    10/17/23 1700  mineral oil liquid 30 mL  Once,   Status:  Discontinued         10/17/23 1611    10/17/23 1700  oxytocin (PITOCIN) 30 units in 0.9% sodium chloride 500 mL (premix)  Once        See Hyperspace for full Linked Orders Report.    10/17/23 1611    10/17/23 1700  oxytocin (PITOCIN) 30 units in 0.9% sodium chloride 500 mL (premix)  Titrated         10/17/23 1611    10/17/23 1700  penicillin g 5 MU/100 mL 0.9% NS IVPB (mbp)  Once        See Hyperspace for full Linked Orders Report.    10/17/23 1611    10/17/23 1659  ABO RH Specimen Verification  STAT         10/17/23 1658    10/17/23 1612  Weigh Patient Daily  Daily       10/17/23 1612     10/17/23 161  Admit To Obstetrics Inpatient  Once         10/17/23 1611    10/17/23 161  Code Status and Medical Interventions:  Continuous         10/17/23 1611    10/17/23 161  Vital Signs Per hospital policy  Per Hospital Policy         10/17/23 1611    10/17/23 161  Keep exams to a minimum on patients with SROM  Until Discontinued         10/17/23 1611    10/17/23 161  Mini- prep prior to delivery  Once         10/17/23 1611    10/17/23 1612  Continuous Fetal Monitoring With NST on Admission and Prior to Initiation of Oxytocin.  Per Order Details        Comments: Continuous Fetal Monitoring With NST on Admission & Prior to Initiation of Oxytocin.    10/17/23 1611    10/17/23 161  External Uterine Contraction Monitoring  Per Hospital Policy         10/17/23 1611    10/17/23 161  Notify Physician (specified)  Until Discontinued         10/17/23 1611    10/17/23 161  Notify physician for hyperstimulus (per hospital algorithm)  Until Discontinued         10/17/23 1611    10/17/23 1612  Notify physician if membranes ruptured, bleeding greater than 1 pad an hour, fetal heart tone abnormality, and severe pain  Until Discontinued         10/17/23 1611    10/17/23 161  Up Ad Annmarie  Until Discontinued         10/17/23 1611    10/17/23 1612  Initiate Group Beta Strep (GBS) Prophylaxis Protocol, If Criteria Met  Continuous        Comments: NO TREATMENT RECOMMENDED IF: 1)  Maternal GBS status known negative 2)  Scheduled  birth with intact membranes, not in labor.  3 ) Maternal GBS unknown, no risk factors.   TREAT WITH ANTIBIOTICS IF:  1)  Maternal GBS status is known postive.  2)  Maternal GBS status unknown with these risk factors:  a)  Previous infant affected by GBS infection.  b)  GBS urinary tract infection (UTI) or bacteruria during pregnancy  c)  Unexplained maternal fever in labor (greater than or equal to 100.4F or 38.0C)  d)  Prolonged rupture of the membranes greater than or equal to 18 hours.   e)  Gestational age less than 37 weeks.    10/17/23 1611    10/17/23 1612  Lyons Bulb Cervical Ripening Without Infusion  Once        Comments: Have Laborist Place Cervical Lyons Without Infusion.    If Unable to Place Lyons, Start Low Dose Pitocin Drip Overnight, Then Increase Per Protocol at 0500 Next Morning    10/17/23 1611    10/17/23 1612  NPO Diet NPO Type: Ice Chips  Diet Effective Now,   Status:  Canceled         10/17/23 1611    10/17/23 1612  CBC (No Diff)  Once,   Status:  Canceled         10/17/23 1611    10/17/23 1612  Type & Screen  Once,   Status:  Canceled         10/17/23 1611    10/17/23 1612  Insert Peripheral IV  Once         10/17/23 1611    10/17/23 1612  Saline Lock & Maintain IV Access  Continuous         10/17/23 1611    10/17/23 1612  Notify Physician (specified)  Until Discontinued         10/17/23 1611    10/17/23 1612  Vital Signs Per hospital policy  Per Hospital Policy         10/17/23 1611    10/17/23 1612  Up Ad Annmarie  Until Discontinued         10/17/23 1611    10/17/23 1612  Fundal & Lochia Check  Per Order Details        Comments: Every 15 Minutes x4, Then Every 30 Minutes x2, Then Every Shift    10/17/23 1611    10/17/23 1612  Diet: Regular/House Diet; Texture: Regular Texture (IDDSI 7); Fluid Consistency: Thin (IDDSI 0)  Diet Effective Now         10/17/23 1611    10/17/23 1612  Postpartum RhIg Evaluation  Once,   Status:  Canceled         10/17/23 1611    10/17/23 1612  Fetal Bleed Screen  Once,   Status:  Canceled         10/17/23 1611    10/17/23 1612  Place Sequential Compression Device  Once         10/17/23 1611    10/17/23 1611  sodium chloride 0.9 % flush 10 mL  As Needed,   Status:  Discontinued         10/17/23 1611    10/17/23 1611  sodium chloride 0.9 % infusion 40 mL  As Needed         10/17/23 1611    10/17/23 1611  lidocaine PF 1% (XYLOCAINE) injection 0.5 mL  Once As Needed,   Status:  Discontinued         10/17/23 1611    10/17/23 1611  acetaminophen (TYLENOL)  tablet 650 mg  Every 4 Hours PRN,   Status:  Discontinued         10/17/23 1611    10/17/23 1611  Fundal & Lochia Check  Every Shift       10/17/23 1611    10/17/23 1611  acetaminophen (TYLENOL) tablet 650 mg  Every 4 Hours PRN,   Status:  Discontinued         10/17/23 1611    10/17/23 1611  ibuprofen (ADVIL,MOTRIN) tablet 600 mg  Every 6 Hours PRN,   Status:  Discontinued         10/17/23 1611    10/17/23 1611  HYDROcodone-acetaminophen (NORCO)  MG per tablet 1 tablet  Every 4 Hours PRN,   Status:  Discontinued         10/17/23 1611    10/17/23 1611  methylergonovine (METHERGINE) injection 200 mcg  Once As Needed,   Status:  Discontinued         10/17/23 1611    10/17/23 1611  carboprost (HEMABATE) injection 250 mcg  As Needed,   Status:  Discontinued         10/17/23 1611    10/17/23 1611  miSOPROStol (CYTOTEC) tablet 800 mcg  As Needed,   Status:  Discontinued         10/17/23 1611    10/17/23 1611  ondansetron (ZOFRAN) tablet 4 mg  Every 6 Hours PRN,   Status:  Discontinued        See Hyperspace for full Linked Orders Report.    10/17/23 1611    10/17/23 1611  ondansetron (ZOFRAN) injection 4 mg  Every 6 Hours PRN,   Status:  Discontinued        See Hyperspace for full Linked Orders Report.    10/17/23 1611    10/17/23 1611  Code Status and Medical Interventions:  Continuous,   Status:  Canceled         10/17/23 1611    10/17/23 1611  Obtain Informed Consent  Once         10/17/23 1611    10/17/23 1611  Vital Signs Per hospital policy  Per Hospital Policy         10/17/23 1611    10/17/23 1611  Mini- prep prior to delivery  Once         10/17/23 1611    10/17/23 1611  Continuous Fetal Monitoring With NST on Admission and Prior to Initiation of Oxytocin.  Per Order Details        Comments: Continuous Fetal Monitoring With NST on Admission & Prior to Initiation of Oxytocin.    10/17/23 1611    10/17/23 1611  External Uterine Contraction Monitoring  Per Hospital Policy         10/17/23 1611    10/17/23 1611   Notify Physician (specified)  Until Discontinued         10/17/23 1611    10/17/23 161  Notify physician for hyperstimulus (per hospital algorithm)  Until Discontinued         10/17/23 1611    10/17/23 161  Notify physician if membranes ruptured, bleeding greater than 1 pad an hour, fetal heart tone abnormality, and severe pain  Until Discontinued         10/17/23 1611    10/17/23 161  Initiate Group Beta Strep (GBS) Prophylaxis Protocol, If Criteria Met  Continuous        Comments: NO TREATMENT RECOMMENDED IF: 1)  Maternal GBS status known negative 2)  Scheduled  birth with intact membranes, not in labor.  3 ) Maternal GBS unknown, no risk factors.   TREAT WITH ANTIBIOTICS IF:  1)  Maternal GBS status is known postive.  2)  Maternal GBS status unknown with these risk factors:  a)  Previous infant affected by GBS infection.  b)  GBS urinary tract infection (UTI) or bacteruria during pregnancy  c)  Unexplained maternal fever in labor (greater than or equal to 100.4F or 38.0C)  d)  Prolonged rupture of the membranes greater than or equal to 18 hours.  e)  Gestational age less than 37 weeks.    10/17/23 1611    10/17/23 161  NPO Diet NPO Type: Ice Chips  Diet Effective Now,   Status:  Canceled         10/17/23 1611    10/17/23 161  CBC (No Diff)  Once         10/17/23 1611    10/17/23 161  Urine Drug Screen -  Once         10/17/23 1611    10/17/23 161  Type & Screen  Once         10/17/23 1611    10/17/23 161  Insert Peripheral IV  Once         10/17/23 1611    10/17/23 161  Saline Lock & Maintain IV Access  Continuous,   Status:  Canceled         10/17/23 1611    10/17/23 161  sodium chloride 0.9 % flush 10 mL  As Needed         10/17/23 1611    10/17/23 161  sodium chloride 0.9 % infusion 40 mL  As Needed,   Status:  Discontinued         10/17/23 1611    10/17/23 161  lidocaine PF 1% (XYLOCAINE) injection 0.5 mL  Once As Needed         10/17/23 1611    10/17/23 161  lactated ringers bolus 1,000  mL  Once As Needed         10/17/23 1611    10/17/23 1611  acetaminophen (TYLENOL) tablet 650 mg  Every 4 Hours PRN         10/17/23 1611    10/17/23 1611  terbutaline (BRETHINE) injection 0.25 mg  As Needed         10/17/23 1611    10/17/23 1611  Notify Physician (specified)  Until Discontinued         10/17/23 1611    10/17/23 1611  Vital Signs Per hospital policy  Per Hospital Policy         10/17/23 1611    10/17/23 1611  Fundal & Lochia Check  Per Order Details        Comments: Every 15 Minutes x4, Then Every 30 Minutes x2, Then Every Shift    10/17/23 1611    10/17/23 1611  Fundal & Lochia Check  Every Shift       10/17/23 1611    10/17/23 1611  Diet: Regular/House Diet; Texture: Regular Texture (IDDSI 7); Fluid Consistency: Thin (IDDSI 0)  Diet Effective Now,   Status:  Canceled         10/17/23 1611    10/17/23 1611  acetaminophen (TYLENOL) tablet 650 mg  Every 4 Hours PRN,   Status:  Discontinued         10/17/23 1611    10/17/23 1611  ibuprofen (ADVIL,MOTRIN) tablet 600 mg  Every 6 Hours PRN         10/17/23 1611    10/17/23 1611  HYDROcodone-acetaminophen (NORCO) 5-325 MG per tablet 1 tablet  Every 4 Hours PRN         10/17/23 1611    10/17/23 1611  HYDROcodone-acetaminophen (NORCO)  MG per tablet 1 tablet  Every 4 Hours PRN         10/17/23 1611    10/17/23 1611  methylergonovine (METHERGINE) injection 200 mcg  Once As Needed         10/17/23 1611    10/17/23 1611  carboprost (HEMABATE) injection 250 mcg  As Needed         10/17/23 1611    10/17/23 1611  miSOPROStol (CYTOTEC) tablet 800 mcg  As Needed         10/17/23 1611    10/17/23 1611  ondansetron (ZOFRAN) tablet 4 mg  Every 6 Hours PRN        See Hyperspace for full Linked Orders Report.    10/17/23 1611    10/17/23 1611  ondansetron (ZOFRAN) injection 4 mg  Every 6 Hours PRN        See Hyperspace for full Linked Orders Report.    10/17/23 1611    10/17/23 1611  Place Sequential Compression Device  Once         10/17/23 1611    Unscheduled   Position change  As Needed      Comments: For intra-uterine resuscitation for hypertonus, hypertstimulation, or non-reassuring fetal status    10/17/23 1611    Unscheduled  Position change  As Needed      Comments: For intra-uterine resuscitation for hypertonus, hypertstimulation, or non-reassuring fetal status    10/17/23 1611    Unscheduled  Apply Ice to Perineum  As Needed       10/17/23 1611    Unscheduled  Bladder Assessment  As Needed       10/17/23 1611    Signed and Held  Transfer Patient  Once         Signed and Held    Signed and Held  Code Status and Medical Interventions:  Continuous         Signed and Held    Signed and Held  Vital Signs Per Hospital Policy  Per Hospital Policy         Signed and Held    Signed and Held  Notify Physician  Until Discontinued         Signed and Held    Signed and Held  Up Ad Annmarie  Until Discontinued         Signed and Held    Signed and Held  Ambulate Patient  Every Shift       Signed and Held    Signed and Held  Patient May Shower  Once         Signed and Held    Signed and Held  Advance Diet As Tolerated -Regular  Until Discontinued         Signed and Held    Signed and Held  Fundal and Lochia Check  Per Hospital Policy        Comments: Q 15 min x 4, Q 30 min x 2, then Q Shift    Signed and Held    Signed and Held  RN to Assess Rh Status & Place RhIG Evaluation Order if Indicated  Continuous         Signed and Held    Signed and Held  Bladder Assessment  Per Order Details        Comments: Postpartum 1) Upon Admission to Unit & Every 4 Hours PRN Until Voiding. 2) Out of Bed to Void in 8 Hours.    Signed and Held    Signed and Held  Straight Cath  Per Order Details        Comments: Postpartum: If Distended & Unable to Void, May Repeat Once.    Signed and Held    Signed and Held  Indwelling Urinary Catheter  Per Order Details        Comments: Postpartum : After Straight Cathed x2 or if Greater Than 1000mL Residual, Insert Indwelling Urinary Catheter Until Further MD Order.     Signed and Held    Signed and Held  Apply Ice to Perineum  As Needed        Comments: For 20 min q 2 hrs    Signed and Held    Signed and Held  Waffle Cushion  As Needed        Comments: For perineal discomfort    Signed and Held    Signed and Held  Donut Ring  As Needed        Comments: For perineal pain    Signed and Held    Signed and Held  Sitz Bath  3 Times Daily        Comments: PRN    Signed and Held    Signed and Held  Kpad  As Needed        Comments: For pain    Signed and Held    Signed and Held  Warm compress  As Needed         Signed and Held    Signed and Held  Breast pump to bed  Once         Signed and Held    Signed and Held  Apply ace wrap, tight bra, or binder  As Needed         Signed and Held    Signed and Held  Apply ice packs  As Needed         Signed and Held    Signed and Held  Postpartum RhIg Evaluation  Once         Signed and Held    Signed and Held  Fetal Bleed Screen  Once         Signed and Held    Signed and Held  If indicated -- Please administer RH Immunoglobulin based on results of cord blood evaluation and fetal screen lab tests, pharmacy to dispense  Continuous        Comments: See process instructions for reference range details.    Signed and Held    Signed and Held  CBC & Differential  Timed        Comments: Postpartum Day 1      Signed and Held    Signed and Held  sodium chloride 0.9 % flush 1-10 mL  As Needed         Signed and Held    Signed and Held  oxytocin (PITOCIN) 30 units in 0.9% sodium chloride 500 mL (premix)  Continuous PRN         Signed and Held    Signed and Held  ibuprofen (ADVIL,MOTRIN) tablet 600 mg  Every 6 Hours PRN         Signed and Held    Signed and Held  acetaminophen (TYLENOL) tablet 650 mg  Every 6 Hours PRN         Signed and Held    Signed and Held  HYDROcodone-acetaminophen (NORCO) 5-325 MG per tablet 1 tablet  Every 4 Hours PRN        See [facility] for full Linked Orders Report.    Signed and Held    Signed and Held  HYDROcodone-acetaminophen  (NORCO)  MG per tablet 1 tablet  Every 4 Hours PRN        See [facility] for full Linked Orders Report.    Signed and Held    Signed and Held  bisacodyl (DULCOLAX) suppository 10 mg  Daily PRN         Signed and Held    Signed and Held  magnesium hydroxide (MILK OF MAGNESIA) suspension 10 mL  Daily PRN         Signed and Held    Signed and Held  docusate sodium (COLACE) capsule 100 mg  2 Times Daily         Signed and Held    Signed and Held  lanolin topical 1 application   Every 1 Hour PRN         Signed and Held    Signed and Held  benzocaine-menthol (DERMOPLAST) 20-0.5 % topical spray  As Needed         Signed and Held    Signed and Held  witch hazel-glycerin (TUCKS) pad  As Needed         Signed and Held    Signed and Held  Hydrocortisone (Perianal) (ANUSOL-HC) 2.5 % rectal cream 1 application   As Needed         Signed and Held    Signed and Held  benzocaine (AMERICAINE) 20 % rectal ointment 1 application   As Needed         Signed and Held    Signed and Held  Place Sequential Compression Device  Once         Signed and Held    Signed and Held  Maintain Sequential Compression Device  Continuous         Signed and Held    Signed and Held  diphenhydrAMINE (BENADRYL) capsule 25 mg  Nightly PRN         Signed and Held                     Operative/Procedure Notes (last 24 hours)        Maddison Tierney MD at 10/18/23 [address] [place] [address]   Vaginal Delivery Note    Patient Name: Sis Lerma  : 2000  MRN: [id number]    Date of Delivery: 10/18/2023     Diagnosis     Pre & Post-Delivery:  Intrauterine pregnancy at 39w2d  Labor status: Induced Onset of Labor     Pregnant             Problem List    Transfer to Postpartum     Review the Delivery Report for details.     Delivery     Delivery: Vaginal, Spontaneous     YOB: 2022    Time of Birth:  Gestational Age 12:17 PM   39w2d     Anesthesia: Epidural     Delivering clinician: Maddison Tierney    Forceps?   No   Vacuum? No   "  Shoulder dystocia present: No        Delivery narrative:   over small MLE due to fetal breadycardia and bilateral periurethral  lacerations repaired with 2-0 Vicryl/3-0 Vicryl and 3-0 Chromic respectively.  Rectal mucosa and sphincter intact.  Placenta spontaneous and intact.  Uterus explored and empty.         Infant     Findings: male  infant     Infant observations: Weight: 3585 g (7 lb 14.5 oz)   Length: 19.5  in  Observations/Comments:        Apgars:   @ 1 minute /      @ 5 minutes   Infant Name:      Placenta & Cord         Placenta delivered  Spontaneous  at   10/18/2023 12:23 PM     Cord: 3 vessels  present.   Nuchal Cord?  no   Cord blood obtained: Yes    Cord gases obtained:  No    Cord gas results: Venous:  No results found for: \"PHCVEN\", \"BECVEN\"    Arterial:  No results found for: \"PHCART\", \"BECART\"     Repair     Episiotomy: Midline    Lacerations: As above   Estimated Blood Loss:  200 cc       Complications     none    Disposition     Mother to Mother Baby/Postpartum  in stable condition currently.  Baby to remains with mom  in stable condition currently.    Maddison Tierney MD  10/18/23  14:00 EDT          Electronically signed by Maddison Tierney MD at 10/18/23 1400       Syed Palomino III, MD at 10/17/23 1742        Pre-procedure Diagnoses    1. Rigid cervix (uteri) affecting pregnancy, third trimester [O34.43]    2. Encounter for elective induction of labor [Z34.90]               Post-procedure Diagnoses    1. Rigid cervix (uteri) affecting pregnancy, third trimester [O34.43]    2. Encounter for elective induction of labor [Z34.90]               Procedures    1. INSERTION OF CERVICAL DILATOR [OBO3 (Custom)]                 Patient admitted for elective induction of labor at 39 weeks 1 day gestation.  Received request for placement of transcervical Lyons bulb for cervical ripening.  Cervical examination: Fingertip/40%/-3 station (cervix posterior and medium texture).  Cephalic presentation " "confirmed by bedside ultrasound.  Transcervical Lyons placed per physician request.  FHT's class 1.  Patient tolerated well.  24 Mauritanian, 60ml.    Syed Panchal \"[place]\" ALAN Palomino MD  10/17/2023  17:43 EDT        Electronically signed by Syed Palomino III, MD at 10/17/23 9311       Physician Progress Notes (last 24 hours)  Notes from 10/17/23 1444 through 10/18/23 1444   No notes of this type exist for this encounter.       "

## 2023-10-18 NOTE — CASE MANAGEMENT/SOCIAL WORK
Continued Stay Note  [facility]     Patient Name: Sis Lerma  MRN: [id number]  Today's Date: 10/17/2023    Admit Date: 10/17/2023    Plan: Home   Discharge Plan       Row Name 10/17/23 2034       Plan    Plan Home    Plan Comments MSW received consult due to UDS +THC. MSW reviewed Pt’s labs; Pt was +THC on 3/23/23. Awaiting cord stat. MSW available.    Final Discharge Disposition Code 01 - home or self-care                   Discharge Codes    No documentation.                       SAUL Garcia

## 2023-10-18 NOTE — ANESTHESIA PROCEDURE NOTES
Labor Epidural      Patient reassessed immediately prior to procedure    Patient location during procedure: OB  Start Time: 10/18/2023 4:27 AM  Stop Time: 10/18/2023 4:43 AM  Performed By  Anesthesiologist: Trever Gomez MD  Preanesthetic Checklist  Completed: patient identified, IV checked, site marked, risks and benefits discussed, surgical consent, monitors and equipment checked, pre-op evaluation and timeout performed  Prep:  Pt Position:sitting  Sterile Tech:cap, gloves, mask and sterile barrier  Prep:DuraPrep  Monitoring:blood pressure monitoring  Epidural Block Procedure:  Approach:midline  Guidance:landmark technique  Needle Type:Tuohy  Needle Gauge:17 G  Loss of Resistance Medium: air  Loss of Resistance: 5cm  Cath Depth at skin:15 cm  Paresthesia: none  Aspiration:negative  Test Dose:negative  Number of Attempts: 1  Post Assessment:  Dressing:occlusive dressing applied and secured with tape  Pt Tolerance:patient tolerated the procedure well with no apparent complications  Complications:no

## 2023-10-18 NOTE — SIGNIFICANT NOTE
10/18/23 1630   Milk Expression/Equipment   Breast Pump Type double electric, personal  (I gave patient a Spectra pump from [employer]. I turned the [employer] education channel on the TV to watch Spectra instructional video.)

## 2023-10-18 NOTE — L&D DELIVERY NOTE
" [facility]   Vaginal Delivery Note    Patient Name: Sis Lerma  : 2000  MRN: [id number]    Date of Delivery: 10/18/2023     Diagnosis     Pre & Post-Delivery:  Intrauterine pregnancy at 39w2d  Labor status: Induced Onset of Labor     Pregnant             Problem List    Transfer to Postpartum     Review the Delivery Report for details.     Delivery     Delivery: Vaginal, Spontaneous     YOB: 2022    Time of Birth:  Gestational Age 12:17 PM   39w2d     Anesthesia: Epidural     Delivering clinician: Maddison Tierney    Forceps?   No   Vacuum? No    Shoulder dystocia present: No        Delivery narrative:   over small MLE due to fetal breadycardia and bilateral periurethral  lacerations repaired with 2-0 Vicryl/3-0 Vicryl and 3-0 Chromic respectively.  Rectal mucosa and sphincter intact.  Placenta spontaneous and intact.  Uterus explored and empty.         Infant     Findings: male  infant     Infant observations: Weight: 3585 g (7 lb 14.5 oz)   Length: 19.5  in  Observations/Comments:        Apgars:   @ 1 minute /      @ 5 minutes   Infant Name:      Placenta & Cord         Placenta delivered  Spontaneous  at   10/18/2023 12:23 PM     Cord: 3 vessels  present.   Nuchal Cord?  no   Cord blood obtained: Yes    Cord gases obtained:  No    Cord gas results: Venous:  No results found for: \"PHCVEN\", \"BECVEN\"    Arterial:  No results found for: \"PHCART\", \"BECART\"     Repair     Episiotomy: Midline    Lacerations: As above   Estimated Blood Loss:  200 cc       Complications     none    Disposition     Mother to Mother Baby/Postpartum  in stable condition currently.  Baby to remains with mom  in stable condition currently.    Maddison Tierney MD  10/18/23  14:00 EDT        "

## 2023-10-18 NOTE — SIGNIFICANT NOTE
10/18/23 1532   Maternal Information   Date of Referral 10/18/23   Person Making Referral lactation consultant   Maternal Reason for Referral no prior breastfeeding experience  (Pt states she would like to breastfeed for about 2 weeks then pump/bottle feed. Educated on supply/demand along with pumping q3 hrs once she starts to pump & supp)   Maternal Assessment   Breast Size Issue none   Breast Shape Bilateral:;round   Breast Density Bilateral:;soft   Nipples Bilateral:;everted   Left Nipple Symptoms intact;nontender   Right Nipple Symptoms intact;nontender   Maternal Infant Feeding   Maternal Emotional State receptive;relaxed   Infant Positioning clutch/football  (Left)   Signs of Milk Transfer deep jaw excursions noted;audible swallow   Pain with Feeding no   Comfort Measures Before/During Feeding suction broken using finger;maternal position adjusted;latch adjusted;infant position adjusted   Latch Assistance   (pt took over after demonstration given.)   Support Person Involvement actively supporting mother   Milk Expression/Equipment   Breast Pump Type double electric, personal  (pt would like to get the Spectra pump)   Breast Pumping   Breast Pumping Interventions   (to pump for short or missed feedings.)

## 2023-10-18 NOTE — ANESTHESIA PREPROCEDURE EVALUATION
Anesthesia Evaluation     Patient summary reviewed and Nursing notes reviewed                Airway   Mallampati: I  TM distance: >3 FB  Neck ROM: full  No difficulty expected  Dental - normal exam     Pulmonary - negative pulmonary ROS and normal exam   Cardiovascular - negative cardio ROS and normal exam        Neuro/Psych- negative ROS  GI/Hepatic/Renal/Endo - negative ROS     Musculoskeletal (-) negative ROS    Abdominal  - normal exam    Bowel sounds: normal.   Substance History - negative use     OB/GYN    (+) Pregnant        Other                    Anesthesia Plan    ASA 2 - emergent     epidural       Anesthetic plan, risks, benefits, and alternatives have been provided, discussed and informed consent has been obtained with: patient.    Use of blood products discussed with patient .    Plan discussed with attending.    CODE STATUS:    Level Of Support Discussed With: Patient  Code Status (Patient has no pulse and is not breathing): CPR (Attempt to Resuscitate)  Medical Interventions (Patient has pulse or is breathing): Full Support

## 2023-10-19 LAB
BASOPHILS # BLD AUTO: 0.03 10*3/MM3 (ref 0–0.2)
BASOPHILS NFR BLD AUTO: 0.2 % (ref 0–1.5)
DEPRECATED RDW RBC AUTO: 48 FL (ref 37–54)
EOSINOPHIL # BLD AUTO: 0.08 10*3/MM3 (ref 0–0.4)
EOSINOPHIL NFR BLD AUTO: 0.7 % (ref 0.3–6.2)
ERYTHROCYTE [DISTWIDTH] IN BLOOD BY AUTOMATED COUNT: 13 % (ref 12.3–15.4)
HCT VFR BLD AUTO: 31 % (ref 34–46.6)
HGB BLD-MCNC: 11.1 G/DL (ref 12–15.9)
IMM GRANULOCYTES # BLD AUTO: 0.08 10*3/MM3 (ref 0–0.05)
IMM GRANULOCYTES NFR BLD AUTO: 0.7 % (ref 0–0.5)
LYMPHOCYTES # BLD AUTO: 1.49 10*3/MM3 (ref 0.7–3.1)
LYMPHOCYTES NFR BLD AUTO: 12.4 % (ref 19.6–45.3)
MCH RBC QN AUTO: 36.2 PG (ref 26.6–33)
MCHC RBC AUTO-ENTMCNC: 35.8 G/DL (ref 31.5–35.7)
MCV RBC AUTO: 101 FL (ref 79–97)
MONOCYTES # BLD AUTO: 0.72 10*3/MM3 (ref 0.1–0.9)
MONOCYTES NFR BLD AUTO: 6 % (ref 5–12)
NEUTROPHILS NFR BLD AUTO: 80 % (ref 42.7–76)
NEUTROPHILS NFR BLD AUTO: 9.63 10*3/MM3 (ref 1.7–7)
NRBC BLD AUTO-RTO: 0 /100 WBC (ref 0–0.2)
PLATELET # BLD AUTO: 127 10*3/MM3 (ref 140–450)
PMV BLD AUTO: 11.7 FL (ref 6–12)
RBC # BLD AUTO: 3.07 10*6/MM3 (ref 3.77–5.28)
WBC NRBC COR # BLD: 12.03 10*3/MM3 (ref 3.4–10.8)

## 2023-10-19 PROCEDURE — 0503F POSTPARTUM CARE VISIT: CPT

## 2023-10-19 PROCEDURE — 85025 COMPLETE CBC W/AUTO DIFF WBC: CPT | Performed by: OBSTETRICS & GYNECOLOGY

## 2023-10-19 RX ADMIN — IBUPROFEN 600 MG: 600 TABLET, FILM COATED ORAL at 21:00

## 2023-10-19 RX ADMIN — DOCUSATE SODIUM 100 MG: 100 CAPSULE, LIQUID FILLED ORAL at 21:00

## 2023-10-19 RX ADMIN — IBUPROFEN 600 MG: 600 TABLET, FILM COATED ORAL at 01:12

## 2023-10-19 RX ADMIN — IBUPROFEN 600 MG: 600 TABLET, FILM COATED ORAL at 13:32

## 2023-10-19 RX ADMIN — ACETAMINOPHEN 650 MG: 325 TABLET ORAL at 05:56

## 2023-10-19 NOTE — LACTATION NOTE
10/19/23 1022   Maternal Information   Date of Referral 10/19/23   Person Making Referral lactation consultant   Maternal Reason for Referral no prior breastfeeding experience  (courtesy follow up visit. Pt states infant is sleepy & was just circumcised.)   Maternal Infant Feeding   Maternal Emotional State relaxed;receptive   Infant Positioning   (several attempts made to latch infant. Started off in RCC then repositoned to RFB. Placed a S nipple shield on R nipple & infant latched taking a couple suckles before falling asleep. Placed infant skin to skin & re-educ on feeding cues.)   Comfort Measures Before/During Feeding infant position adjusted;latch adjusted;maternal position adjusted;suction broken using finger   Latch Assistance minimal assistance   Support Person Involvement actively supporting mother   Milk Expression/Equipment   Breast Pump Type double electric, personal  (Spectra pump at bedside & encouraged to begin pumping if infant does not wake up & start nursing by late afternoon. Reiterated that pumping is encouraged for short or missed feedings.)

## 2023-10-19 NOTE — ANESTHESIA POSTPROCEDURE EVALUATION
Patient: Sis Lerma    Procedure Summary       Date: 10/18/23 Room / Location:     Anesthesia Start: 0426 Anesthesia Stop: 1223    Procedure: LABOR ANALGESIA Diagnosis:     Scheduled Providers:  Provider: Mildred Escobar DO    Anesthesia Type: epidural ASA Status: 2 - Emergent            Anesthesia Type: epidural    Vitals  Vitals Value Taken Time   /73 10/19/23 0700   Temp 98.6 °F (37 °C) 10/19/23 0700   Pulse 84 10/19/23 0700   Resp 16 10/19/23 0700   SpO2             Post Anesthesia Care and Evaluation    Patient location during evaluation: bedside  Patient participation: complete - patient participated  Level of consciousness: awake and alert  Pain management: adequate    Airway patency: patent  Anesthetic complications: No anesthetic complications    Cardiovascular status: acceptable  Respiratory status: acceptable  Hydration status: acceptable  Post Neuraxial Block status: Motor and sensory function returned to baseline and No signs or symptoms of PDPH

## 2023-10-19 NOTE — PROGRESS NOTES
Postpartum Progress Note    Patient name: Sis Lerma  YOB: 2000   MRN: [id number]  Referring Provider: Mary Sanchez MD  Admission Date: 10/17/2023  Date of Service: 10/19/2023    ID: 23 y.o.     Diagnosis:   S/p vaginal delivery      (spontaneous vaginal delivery)       Subjective:    Feeling tired.  No complaints.  Moderate lochia.  Ambulating, voiding, tolerating diet.  Pain well controlled.  The patient is currently breastfeeding.   This baby is a male, received circumcision this am.     Objective:      Vital signs:  Vital Signs Range for the last 24 hours  Temperature: Temp:  [98.6 °F (37 °C)-99.2 °F (37.3 °C)] 98.6 °F (37 °C)   Temp Source: Temp src: Oral   BP: BP: (114-163)/(56-86) 119/73   Pulse: Heart Rate:  [] 84   Respirations: Resp:  [16-18] 16   Weight: 88.9 kg (196 lb)     General: Alert & oriented x4, in no apparent distress  Abdomen: soft, nontender  Uterus: firm, nontender  Extremities: nontender; no edema      Labs:  Lab Results   Component Value Date    WBC 12.03 (H) 10/19/2023    HGB 11.1 (L) 10/19/2023    HCT 31.0 (L) 10/19/2023    .0 (H) 10/19/2023     (L) 10/19/2023     Results from last 7 days   Lab Units 10/18/23  1614   ABO TYPING  O   RH TYPING  Negative     External Prenatal Results       Pregnancy Outside Results - Transcribed From Office Records - See Scanned Records For Details       Test Value Date Time    ABO  O  10/18/23 1614    Rh  Negative  10/18/23 1614    Antibody Screen  Negative  10/17/23 1645       Negative  23 0947       Negative  23 1509    Varicella IgG       Rubella  1.93 index 23 1509    Hgb  11.1 g/dL 10/19/23 0532       11.4 g/dL 10/17/23 1645       11.8 g/dL 23 0947       12.6 g/dL 23 1509    Hct  31.0 % 10/19/23 0532       32.1 % 10/17/23 1645       33.3 % 23 0947       37.0 % 23 1509    Glucose Fasting GTT       Glucose Tolerance Test 1 hour       Glucose Tolerance  Test 3 hour       Gonorrhea (discrete)       Chlamydia (discrete)       RPR  Non Reactive  03/23/23 1509    VDRL       Syphilis Antibody       HBsAg  Negative  03/23/23 1509    Herpes Simplex Virus PCR       Herpes Simplex VIrus Culture       HIV  Non Reactive  03/23/23 1509    Hep C RNA Quant PCR       Hep C Antibody  Non Reactive  03/23/23 1509    AFP  56.2 ng/mL 05/26/23 1003    Group B Strep  Streptococcus agalactiae (Group B)  09/26/23 1829    GBS Susceptibility to Clindamycin       GBS Susceptibility to Erythromycin       Fetal Fibronectin       Genetic Testing, Maternal Blood                 Drug Screening       Test Value Date Time    Urine Drug Screen       Amphetamine Screen  Negative ng/mL 03/23/23 1509    Barbiturate Screen  Negative ng/mL 03/23/23 1509    Benzodiazepine Screen  Negative ng/mL 03/23/23 1509    Methadone Screen  Negative ng/mL 03/23/23 1509    Phencyclidine Screen  Negative ng/mL 03/23/23 1509    Opiates Screen       THC Screen       Cocaine Screen       Propoxyphene Screen  Negative ng/mL 03/23/23 1509    Buprenorphine Screen       Methamphetamine Screen       Oxycodone Screen       Tricyclic Antidepressants Screen                 Legend    ^: Historical                            Assessment/Plan:      PPD#1 s/p vaginal delivery.  1. S/p vaginal delivery: Doing well.Continue routine postpartum care.    2. Infant feeding: Supportive care.  The patient is currently breastfeeding.  3. RH negative, received rhogam 10/18/23

## 2023-10-20 VITALS
SYSTOLIC BLOOD PRESSURE: 119 MMHG | WEIGHT: 196 LBS | HEIGHT: 63 IN | BODY MASS INDEX: 34.73 KG/M2 | RESPIRATION RATE: 16 BRPM | DIASTOLIC BLOOD PRESSURE: 64 MMHG | TEMPERATURE: 98.7 F | HEART RATE: 74 BPM

## 2023-10-20 PROCEDURE — 0503F POSTPARTUM CARE VISIT: CPT

## 2023-10-20 RX ORDER — IBUPROFEN 600 MG/1
600 TABLET ORAL EVERY 6 HOURS PRN
Qty: 30 TABLET | Refills: 0 | Status: SHIPPED | OUTPATIENT
Start: 2023-10-20

## 2023-10-20 RX ADMIN — IBUPROFEN 600 MG: 600 TABLET, FILM COATED ORAL at 04:33

## 2023-10-20 RX ADMIN — DOCUSATE SODIUM 100 MG: 100 CAPSULE, LIQUID FILLED ORAL at 09:00

## 2023-10-20 RX ADMIN — WITCH HAZEL: 500 SOLUTION RECTAL; TOPICAL at 10:32

## 2023-10-20 RX ADMIN — BENZOCAINE 1 APPLICATION: 5.6 OINTMENT TOPICAL at 10:32

## 2023-10-20 RX ADMIN — Medication: at 10:37

## 2023-10-20 NOTE — DISCHARGE SUMMARY
Discharge Summary    Date of Admission: 10/17/2023  Date of Discharge:  10/20/2023      Patient: Sis Lerma      MR#:[id number]    Delivery Provider: Maddison Tierney     Discharge Surgeon/OB: Niall    Presenting Problem/History of Present Illness  Pregnant [Z34.90]        (spontaneous vaginal delivery)         Discharge Diagnosis: Vaginal delivery at 39w2d    Procedures:  Vaginal, Spontaneous     10/18/2023    12:17 PM        Discharge Date: 10/20/2023;     Hospital Course  Patient is a 23 y.o. female  at 39w2d status post vaginal delivery without complication. Postpartum the patient did well. She remained afebrile, with vital signs stable. Patient reported lochia as mild and pain managed. She was ready for discharge on postpartum day 2.     Infant:   male  fetus 3585 g (7 lb 14.5 oz)  with Apgar scores of 5 , 9  at five minutes.    Condition on Discharge:  Stable    Vital Signs  Temp:  [97.4 °F (36.3 °C)-98.7 °F (37.1 °C)] 98.7 °F (37.1 °C)  Heart Rate:  [74-78] 74  Resp:  [16-18] 16  BP: (119-126)/(64-79) 119/64    Lab Results   Component Value Date    WBC 12.03 (H) 10/19/2023    HGB 11.1 (L) 10/19/2023    HCT 31.0 (L) 10/19/2023    .0 (H) 10/19/2023     (L) 10/19/2023       Discharge Disposition  Home or Self Care    Discharge Medications     Discharge Medications        New Medications        Instructions Start Date   ibuprofen 600 MG tablet  Commonly known as: ADVIL,MOTRIN   600 mg, Oral, Every 6 Hours PRN             Stop These Medications      ferrous sulfate 140 (45 Fe) MG tablet controlled-release tablet     Prenate Mini 18-0.6-0.4-350 MG capsule              Activity at Discharge:   Activity Instructions       Pelvic Rest      No Sex, Tampons, Swimming, Tub Baths x 6 weeks            Follow-up Appointments  Future Appointments   Date Time Provider Department Center   2023  1:20 PM Maddison Tierney MD [facility]     Additional Instructions for the Follow-ups that  You Need to Schedule       Call MD With Problems / Concerns   As directed      Instructions: Instructions: Call for symptoms related to depression, foul smelling discharge, fever >/=100.4F, blood clots bigger than a golf ball, saturating through >/= 1 pad per hour.    Order Comments: Instructions: Instructions: Call for symptoms related to depression, foul smelling discharge, fever >/=100.4F, blood clots bigger than a golf ball, saturating through >/= 1 pad per hour.         Discharge Follow-up with Specified Provider: Niall; 6 Weeks   As directed      To: Niall   Follow Up: 6 Weeks                Georgina Henderson, DALY  10/20/23  09:20 EDT  Cs[facility]

## 2023-10-20 NOTE — LACTATION NOTE
Courtesy follow up visit for newly postpartum couplet. MOB states she desires to formula feed infant. Education on drying up milk supply given, encouraged to call outpatient lactation clinic PRN once discharged, verbalized understanding.

## 2023-10-21 ENCOUNTER — HOSPITAL ENCOUNTER (EMERGENCY)
Facility: HOSPITAL | Age: 23
Discharge: HOME | End: 2023-10-22
Attending: EMERGENCY MEDICINE
Payer: COMMERCIAL

## 2023-10-21 PROCEDURE — 99283 EMERGENCY DEPT VISIT LOW MDM: CPT

## 2023-10-22 VITALS
WEIGHT: 188.2 LBS | TEMPERATURE: 98 F | BODY MASS INDEX: 33.35 KG/M2 | HEIGHT: 63 IN | RESPIRATION RATE: 18 BRPM | SYSTOLIC BLOOD PRESSURE: 154 MMHG | OXYGEN SATURATION: 98 % | HEART RATE: 74 BPM | DIASTOLIC BLOOD PRESSURE: 89 MMHG

## 2023-10-22 RX ORDER — DICLOXACILLIN SODIUM 250 MG/1
500 CAPSULE ORAL ONCE
Status: COMPLETED | OUTPATIENT
Start: 2023-10-22 | End: 2023-10-22

## 2023-10-22 RX ORDER — IBUPROFEN 800 MG/1
800 TABLET ORAL ONCE
Status: DISCONTINUED | OUTPATIENT
Start: 2023-10-22 | End: 2023-10-22 | Stop reason: HOSPADM

## 2023-10-22 RX ADMIN — DICLOXACILLIN SODIUM 500 MG: 250 CAPSULE ORAL at 00:20

## 2023-10-25 ENCOUNTER — HOSPITAL ENCOUNTER (OUTPATIENT)
Facility: HOSPITAL | Age: 23
Setting detail: OBSERVATION
LOS: 2 days | Discharge: HOME | End: 2023-10-27
Attending: EMERGENCY MEDICINE | Admitting: OBSTETRICS & GYNECOLOGY
Payer: COMMERCIAL

## 2023-10-25 PROBLEM — O14.90 PREECLAMPSIA: Status: ACTIVE | Noted: 2023-10-25

## 2023-10-25 LAB
ALBUMIN SERPL-MCNC: 4 G/DL (ref 3.5–5.2)
ALBUMIN/GLOB SERPL: 1.3 G/DL
ALP SERPL-CCNC: 151 U/L (ref 39–117)
ALT SERPL W P-5'-P-CCNC: 28 U/L (ref 1–33)
ANION GAP SERPL CALCULATED.3IONS-SCNC: 11 MMOL/L (ref 5–15)
AST SERPL-CCNC: 21 U/L (ref 1–32)
BACTERIA UR QL AUTO: ABNORMAL /HPF
BASOPHILS # BLD AUTO: 0.04 10*3/MM3 (ref 0–0.2)
BASOPHILS NFR BLD AUTO: 0.5 % (ref 0–1.5)
BILIRUB SERPL-MCNC: 0.2 MG/DL (ref 0–1.2)
BILIRUB UR QL STRIP: NEGATIVE
BUN SERPL-MCNC: 15 MG/DL (ref 6–20)
BUN/CREAT SERPL: 22.7 (ref 7–25)
CALCIUM SPEC-SCNC: 9.5 MG/DL (ref 8.6–10.5)
CHLORIDE SERPL-SCNC: 105 MMOL/L (ref 98–107)
CLARITY UR: ABNORMAL
CO2 SERPL-SCNC: 24 MMOL/L (ref 22–29)
COLOR UR: ABNORMAL
CREAT SERPL-MCNC: 0.66 MG/DL (ref 0.57–1)
DEPRECATED RDW RBC AUTO: 43.8 FL (ref 37–54)
EGFRCR SERPLBLD CKD-EPI 2021: 127.4 ML/MIN/1.73
EOSINOPHIL # BLD AUTO: 0.29 10*3/MM3 (ref 0–0.4)
EOSINOPHIL NFR BLD AUTO: 3.6 % (ref 0.3–6.2)
ERYTHROCYTE [DISTWIDTH] IN BLOOD BY AUTOMATED COUNT: 11.9 % (ref 12.3–15.4)
GLOBULIN UR ELPH-MCNC: 3.1 GM/DL
GLUCOSE SERPL-MCNC: 90 MG/DL (ref 65–99)
GLUCOSE UR STRIP-MCNC: NEGATIVE MG/DL
HCT VFR BLD AUTO: 36.9 % (ref 34–46.6)
HGB BLD-MCNC: 13.1 G/DL (ref 12–15.9)
HGB UR QL STRIP.AUTO: ABNORMAL
HYALINE CASTS UR QL AUTO: ABNORMAL /LPF
IMM GRANULOCYTES # BLD AUTO: 0.06 10*3/MM3 (ref 0–0.05)
IMM GRANULOCYTES NFR BLD AUTO: 0.8 % (ref 0–0.5)
KETONES UR QL STRIP: NEGATIVE
LEUKOCYTE ESTERASE UR QL STRIP.AUTO: ABNORMAL
LYMPHOCYTES # BLD AUTO: 2.69 10*3/MM3 (ref 0.7–3.1)
LYMPHOCYTES NFR BLD AUTO: 33.8 % (ref 19.6–45.3)
MAGNESIUM SERPL-MCNC: 1.9 MG/DL (ref 1.6–2.6)
MCH RBC QN AUTO: 34.8 PG (ref 26.6–33)
MCHC RBC AUTO-ENTMCNC: 35.5 G/DL (ref 31.5–35.7)
MCV RBC AUTO: 98.1 FL (ref 79–97)
MONOCYTES # BLD AUTO: 0.48 10*3/MM3 (ref 0.1–0.9)
MONOCYTES NFR BLD AUTO: 6 % (ref 5–12)
NEUTROPHILS NFR BLD AUTO: 4.39 10*3/MM3 (ref 1.7–7)
NEUTROPHILS NFR BLD AUTO: 55.3 % (ref 42.7–76)
NITRITE UR QL STRIP: NEGATIVE
NRBC BLD AUTO-RTO: 0 /100 WBC (ref 0–0.2)
PH UR STRIP.AUTO: 6.5 [PH] (ref 5–8)
PLATELET # BLD AUTO: 212 10*3/MM3 (ref 140–450)
PMV BLD AUTO: 9.6 FL (ref 6–12)
POTASSIUM SERPL-SCNC: 4.3 MMOL/L (ref 3.5–5.2)
PROT SERPL-MCNC: 7.1 G/DL (ref 6–8.5)
PROT UR QL STRIP: ABNORMAL
RBC # BLD AUTO: 3.76 10*6/MM3 (ref 3.77–5.28)
RBC # UR STRIP: ABNORMAL /HPF
REF LAB TEST METHOD: ABNORMAL
SODIUM SERPL-SCNC: 140 MMOL/L (ref 136–145)
SP GR UR STRIP: 1.02 (ref 1–1.03)
SQUAMOUS #/AREA URNS HPF: ABNORMAL /HPF
UROBILINOGEN UR QL STRIP: ABNORMAL
WBC # UR STRIP: ABNORMAL /HPF
WBC NRBC COR # BLD: 7.95 10*3/MM3 (ref 3.4–10.8)

## 2023-10-25 PROCEDURE — 81001 URINALYSIS AUTO W/SCOPE: CPT | Performed by: NURSE PRACTITIONER

## 2023-10-25 PROCEDURE — 25010000002 MAGNESIUM SULFATE 2 GM/50ML SOLUTION: Performed by: NURSE PRACTITIONER

## 2023-10-25 PROCEDURE — 83735 ASSAY OF MAGNESIUM: CPT | Performed by: NURSE PRACTITIONER

## 2023-10-25 PROCEDURE — 99284 EMERGENCY DEPT VISIT MOD MDM: CPT

## 2023-10-25 PROCEDURE — 80053 COMPREHEN METABOLIC PANEL: CPT | Performed by: NURSE PRACTITIONER

## 2023-10-25 PROCEDURE — 85025 COMPLETE CBC W/AUTO DIFF WBC: CPT | Performed by: NURSE PRACTITIONER

## 2023-10-25 PROCEDURE — 96365 THER/PROPH/DIAG IV INF INIT: CPT

## 2023-10-25 RX ORDER — SODIUM CHLORIDE 0.9 % (FLUSH) 0.9 %
10 SYRINGE (ML) INJECTION AS NEEDED
Status: DISCONTINUED | OUTPATIENT
Start: 2023-10-25 | End: 2023-10-27 | Stop reason: HOSPADM

## 2023-10-25 RX ORDER — MAGNESIUM SULFATE HEPTAHYDRATE 40 MG/ML
2 INJECTION, SOLUTION INTRAVENOUS ONCE
Status: COMPLETED | OUTPATIENT
Start: 2023-10-26 | End: 2023-10-26

## 2023-10-25 RX ADMIN — MAGNESIUM SULFATE HEPTAHYDRATE 2 G: 40 INJECTION, SOLUTION INTRAVENOUS at 23:59

## 2023-10-26 PROCEDURE — 99223 1ST HOSP IP/OBS HIGH 75: CPT | Performed by: MIDWIFE

## 2023-10-26 PROCEDURE — 96375 TX/PRO/DX INJ NEW DRUG ADDON: CPT

## 2023-10-26 PROCEDURE — G0378 HOSPITAL OBSERVATION PER HR: HCPCS

## 2023-10-26 PROCEDURE — 25010000002 MAGNESIUM SULFATE 2 GM/50ML SOLUTION

## 2023-10-26 PROCEDURE — 25010000002 MAGNESIUM SULFATE 20 GM/500ML SOLUTION: Performed by: OBSTETRICS & GYNECOLOGY

## 2023-10-26 PROCEDURE — 25810000003 LACTATED RINGERS PER 1000 ML: Performed by: OBSTETRICS & GYNECOLOGY

## 2023-10-26 PROCEDURE — 96366 THER/PROPH/DIAG IV INF ADDON: CPT

## 2023-10-26 RX ORDER — DICLOXACILLIN SODIUM 250 MG/1
500 CAPSULE ORAL 4 TIMES DAILY
Status: DISCONTINUED | OUTPATIENT
Start: 2023-10-26 | End: 2023-10-27 | Stop reason: HOSPADM

## 2023-10-26 RX ORDER — MAGNESIUM SULFATE HEPTAHYDRATE 40 MG/ML
2 INJECTION, SOLUTION INTRAVENOUS ONCE
Status: COMPLETED | OUTPATIENT
Start: 2023-10-26 | End: 2023-10-26

## 2023-10-26 RX ORDER — MAGNESIUM SULFATE HEPTAHYDRATE 40 MG/ML
2 INJECTION, SOLUTION INTRAVENOUS CONTINUOUS
Status: DISCONTINUED | OUTPATIENT
Start: 2023-10-26 | End: 2023-10-27 | Stop reason: HOSPADM

## 2023-10-26 RX ORDER — SODIUM CHLORIDE, SODIUM LACTATE, POTASSIUM CHLORIDE, CALCIUM CHLORIDE 600; 310; 30; 20 MG/100ML; MG/100ML; MG/100ML; MG/100ML
50 INJECTION, SOLUTION INTRAVENOUS CONTINUOUS
Status: DISCONTINUED | OUTPATIENT
Start: 2023-10-26 | End: 2023-10-27 | Stop reason: HOSPADM

## 2023-10-26 RX ORDER — ACETAMINOPHEN 325 MG/1
650 TABLET ORAL EVERY 6 HOURS PRN
Status: DISCONTINUED | OUTPATIENT
Start: 2023-10-26 | End: 2023-10-27 | Stop reason: HOSPADM

## 2023-10-26 RX ORDER — CHLORHEXIDINE GLUCONATE ORAL RINSE 1.2 MG/ML
15 SOLUTION DENTAL 2 TIMES DAILY
COMMUNITY
Start: 2023-10-23

## 2023-10-26 RX ORDER — IBUPROFEN 600 MG/1
600 TABLET ORAL EVERY 6 HOURS PRN
Status: DISCONTINUED | OUTPATIENT
Start: 2023-10-26 | End: 2023-10-27 | Stop reason: HOSPADM

## 2023-10-26 RX ORDER — MAGNESIUM SULFATE HEPTAHYDRATE 40 MG/ML
INJECTION, SOLUTION INTRAVENOUS
Status: COMPLETED
Start: 2023-10-26 | End: 2023-10-26

## 2023-10-26 RX ORDER — LABETALOL HYDROCHLORIDE 5 MG/ML
10 INJECTION, SOLUTION INTRAVENOUS ONCE
Status: COMPLETED | OUTPATIENT
Start: 2023-10-26 | End: 2023-10-26

## 2023-10-26 RX ORDER — LABETALOL HYDROCHLORIDE 5 MG/ML
INJECTION, SOLUTION INTRAVENOUS
Status: COMPLETED
Start: 2023-10-26 | End: 2023-10-26

## 2023-10-26 RX ADMIN — MAGNESIUM SULFATE HEPTAHYDRATE 8 G/HR: 40 INJECTION, SOLUTION INTRAVENOUS at 01:22

## 2023-10-26 RX ADMIN — DICLOXACILLIN SODIUM 500 MG: 250 CAPSULE ORAL at 19:41

## 2023-10-26 RX ADMIN — SODIUM CHLORIDE, POTASSIUM CHLORIDE, SODIUM LACTATE AND CALCIUM CHLORIDE 50 ML/HR: 600; 310; 30; 20 INJECTION, SOLUTION INTRAVENOUS at 01:24

## 2023-10-26 RX ADMIN — MAGNESIUM SULFATE HEPTAHYDRATE 2 G/HR: 40 INJECTION, SOLUTION INTRAVENOUS at 01:48

## 2023-10-26 RX ADMIN — SODIUM CHLORIDE, POTASSIUM CHLORIDE, SODIUM LACTATE AND CALCIUM CHLORIDE 50 ML/HR: 600; 310; 30; 20 INJECTION, SOLUTION INTRAVENOUS at 21:36

## 2023-10-26 RX ADMIN — LABETALOL HYDROCHLORIDE 10 MG: 5 INJECTION, SOLUTION INTRAVENOUS at 01:19

## 2023-10-26 RX ADMIN — DICLOXACILLIN SODIUM 500 MG: 250 CAPSULE ORAL at 11:20

## 2023-10-26 RX ADMIN — MAGNESIUM SULFATE IN WATER 8 G/HR: 40 INJECTION, SOLUTION INTRAVENOUS at 01:22

## 2023-10-26 RX ADMIN — MAGNESIUM SULFATE HEPTAHYDRATE 2 G/HR: 40 INJECTION, SOLUTION INTRAVENOUS at 11:19

## 2023-10-26 RX ADMIN — IBUPROFEN 600 MG: 600 TABLET ORAL at 10:18

## 2023-10-26 RX ADMIN — ACETAMINOPHEN 650 MG: 325 TABLET, FILM COATED ORAL at 16:45

## 2023-10-26 RX ADMIN — MAGNESIUM SULFATE HEPTAHYDRATE 2 G/HR: 40 INJECTION, SOLUTION INTRAVENOUS at 19:42

## 2023-10-26 NOTE — ED PROVIDER NOTES
Subjective  History of Present Illness:    Chief Complaint: Hypertension  History of Present Illness: This is a 23-year-old female patient that is 1 week postpartum that comes into the ED today complaining of elevated blood pressure that has been ongoing for the last week and has progressively gotten worse.  Prebirth patient's blood pressure was 120 systolic.      Nurses Notes reviewed and agree, including vitals, allergies, social history and prior medical history.       Allergies:    Patient has no known allergies.      Past Surgical History:   Procedure Laterality Date    TYMPANOSTOMY TUBE PLACEMENT           Social History     Socioeconomic History    Marital status: Single   Tobacco Use    Smoking status: Former     Packs/day: .33     Types: Cigarettes     Passive exposure: Current    Smokeless tobacco: Never   Vaping Use    Vaping Use: Some days    Substances: Nicotine, Flavoring    Devices: Disposable   Substance and Sexual Activity    Alcohol use: Never    Drug use: Never    Sexual activity: Yes     Partners: Male     Birth control/protection: None         Family History   Problem Relation Age of Onset    No Known Problems Father     Hypertension Mother     Breast cancer Maternal Grandmother     Colon cancer Maternal Grandmother     Diabetes Maternal Grandmother     Hyperlipidemia Maternal Grandmother     Hypertension Maternal Grandmother     Colon cancer Maternal Uncle     Ovarian cancer Neg Hx     Uterine cancer Neg Hx        REVIEW OF SYSTEMS: All systems reviewed and not pertinent unless noted.    Review of Systems   Neurological:  Positive for headaches.   All other systems reviewed and are negative.      Objective    Physical Exam  Vitals and nursing note reviewed.   Constitutional:       Appearance: Normal appearance.   HENT:      Head: Normocephalic and atraumatic.   Eyes:      Extraocular Movements: Extraocular movements intact.      Pupils: Pupils are equal, round, and reactive to light.    Cardiovascular:      Rate and Rhythm: Normal rate and regular rhythm.      Pulses: Normal pulses.      Heart sounds: Normal heart sounds.   Pulmonary:      Effort: Pulmonary effort is normal.      Breath sounds: Normal breath sounds.   Abdominal:      General: Abdomen is flat. Bowel sounds are normal.      Palpations: Abdomen is soft.   Musculoskeletal:      Cervical back: Normal range of motion and neck supple.   Skin:     Capillary Refill: Capillary refill takes less than 2 seconds.   Neurological:      General: No focal deficit present.      Mental Status: She is alert and oriented to person, place, and time. Mental status is at baseline.      GCS: GCS eye subscore is 4. GCS verbal subscore is 5. GCS motor subscore is 6.      Sensory: Sensation is intact.      Motor: Motor function is intact.      Gait: Gait is intact.   Psychiatric:         Attention and Perception: Attention and perception normal.         Mood and Affect: Mood and affect normal.         Speech: Speech normal.         Behavior: Behavior normal. Behavior is cooperative.           Procedures    ED Course:         Lab Results (last 24 hours)       Procedure Component Value Units Date/Time    Urinalysis With Microscopic If Indicated (No Culture) - Urine, Clean Catch [[id number]]  (Abnormal) Collected: 10/25/23 2313    Specimen: Urine, Clean Catch Updated: 10/25/23 2325     Color, UA Red     Appearance, UA Cloudy     pH, UA 6.5     Specific Gravity, UA 1.016     Glucose, UA Negative     Ketones, UA Negative     Bilirubin, UA Negative     Blood, UA Large (3+)     Protein,  mg/dL (2+)     Leuk Esterase, UA Large (3+)     Nitrite, UA Negative     Urobilinogen, UA 0.2 E.U./dL    Urinalysis, Microscopic Only - Urine, Clean Catch [[id number]]  (Abnormal) Collected: 10/25/23 2313    Specimen: Urine, Clean Catch Updated: 10/25/23 2330     RBC, UA Too Numerous to Count /HPF      WBC, UA 21-50 /HPF      Bacteria, UA 1+ /HPF      Squamous Epithelial  Cells, UA 0-2 /HPF      Hyaline Casts, UA None Seen /LPF      Methodology Manual Light Microscopy    CBC & Differential [[id number]]  (Abnormal) Collected: 10/25/23 2314    Specimen: Blood Updated: 10/25/23 2319    Narrative:      The following orders were created for panel order CBC & Differential.  Procedure                               Abnormality         Status                     ---------                               -----------         ------                     CBC Auto Differential[[id number]]        Abnormal            Final result                 Please view results for these tests on the individual orders.    Comprehensive Metabolic Panel [[id number]]  (Abnormal) Collected: 10/25/23 2314    Specimen: Blood Updated: 10/25/23 2339     Glucose 90 mg/dL      BUN 15 mg/dL      Creatinine 0.66 mg/dL      Sodium 140 mmol/L      Potassium 4.3 mmol/L      Chloride 105 mmol/L      CO2 24.0 mmol/L      Calcium 9.5 mg/dL      Total Protein 7.1 g/dL      Albumin 4.0 g/dL      ALT (SGPT) 28 U/L      AST (SGOT) 21 U/L      Alkaline Phosphatase 151 U/L      Total Bilirubin 0.2 mg/dL      Globulin 3.1 gm/dL      A/G Ratio 1.3 g/dL      BUN/Creatinine Ratio 22.7     Anion Gap 11.0 mmol/L      eGFR 127.4 mL/min/1.73     Narrative:      GFR Normal >60  Chronic Kidney Disease <60  Kidney Failure <15      Magnesium [[id number]]  (Normal) Collected: 10/25/23 2314    Specimen: Blood Updated: 10/25/23 2339     Magnesium 1.9 mg/dL     CBC Auto Differential [[id number]]  (Abnormal) Collected: 10/25/23 2314    Specimen: Blood Updated: 10/25/23 2319     WBC 7.95 10*3/mm3      RBC 3.76 10*6/mm3      Hemoglobin 13.1 g/dL      Hematocrit 36.9 %      MCV 98.1 fL      MCH 34.8 pg      MCHC 35.5 g/dL      RDW 11.9 %      RDW-SD 43.8 fl      MPV 9.6 fL      Platelets 212 10*3/mm3      Neutrophil % 55.3 %      Lymphocyte % 33.8 %      Monocyte % 6.0 %      Eosinophil % 3.6 %      Basophil % 0.5 %      Immature Grans % 0.8 %      Neutrophils,  Absolute 4.39 10*3/mm3      Lymphocytes, Absolute 2.69 10*3/mm3      Monocytes, Absolute 0.48 10*3/mm3      Eosinophils, Absolute 0.29 10*3/mm3      Basophils, Absolute 0.04 10*3/mm3      Immature Grans, Absolute 0.06 10*3/mm3      nRBC 0.0 /100 WBC              No radiology results from the last 24 hrs     Medical Decision Making  This is a 23-year-old female patient that is 1 week postpartum that comes into the ED today complaining of elevated blood pressure that has been ongoing for the last week and has progressively gotten worse.  Prebirth patient's blood pressure was 120 systolic.    DDX: includes but is not limited to: Hypertension, preeclampsia, eclampsia, others    Amount and/or Complexity of Data Reviewed  Labs: ordered. Decision-making details documented in ED Course.     Details: I have personally reviewed and documented all results  Discussion of management or test interpretation with external provider(s): Discussed assessment, treatment and plan with ER attending, Dr. Nayak, OB/GYN on-call.  Dr. nayak has agreed to accept patient in full admission status and placed on mother-baby for magnesium infusion for preeclampsia.    Risk  Prescription drug management.  Decision regarding hospitalization.  Risk Details: Patient has been accepted for admission to mother-baby for preeclampsia                  Final diagnoses:   Preeclampsia in postpartum period          Sha Farley, APRN  10/25/23 2115

## 2023-10-26 NOTE — PLAN OF CARE
Goal Outcome Evaluation:  Plan of Care Reviewed With: patient, significant other        Progress: improving  Outcome Evaluation: Pt arrived this early this morning from the ER.  VTE placement done and pt placed on Magnesium Suulfate.  2gm bolus and 2gm/hr. Pt received 2gm bolus in the ER.  DTR's started @4+ and were 1+ by morning. Pt voided 1200 ml and severe headache gone after 1 dose of labetalol and Mag bolus given. Pt slept the rest of the night.

## 2023-10-26 NOTE — H&P
[facility]  Sis Lerma  : 2000  MRN: [id number]  [facility]: [id number]    History and Physical  Chief Complaint   Patient presents with    Hypertension      Subjective   Sis Lerma is a 23 y.o. year old  who came to the ED last pm with a severe headache. She had  on 10/18 @  [place] after uncomplicated pregnancy. She came to ED on 10/21 and was diagnosed with Mastitis. She states her BP was elevated that visit also. She states headache has improved. She is bottle feeding. She was started on Magnesium in ED, best I can tell @ midnight. She is feeling better this am and denies headache.    History  Past Medical History:   Diagnosis Date    ADHD (attention deficit hyperactivity disorder)     Anxiety 2014    Headache 10/24/2023    just these past 2 days.    Tourette syndrome 2016       Current Facility-Administered Medications:     dicloxacillin (DYNAPEN) capsule 500 mg, 500 mg, Oral, 4x Daily, Kerri Kunz CNM    ibuprofen (ADVIL,MOTRIN) tablet 600 mg, 600 mg, Oral, Q6H PRN, Kerri Kunz CNM    lactated ringers infusion, 50 mL/hr, Intravenous, Continuous, Marina Nayak MD, Last Rate: 50 mL/hr at 10/26/23 0124, 50 mL/hr at 10/26/23 0124    magnesium sulfate 20 GM/500ML infusion, 2 g/hr, Intravenous, Continuous, Marina Nayak MD, Last Rate: 50 mL/hr at 10/26/23 0148, 2 g/hr at 10/26/23 0148    [COMPLETED] Insert Peripheral IV, , , Once **AND** sodium chloride 0.9 % flush 10 mL, 10 mL, Intravenous, PRN, Sha Farley, DALY  No Known Allergies  Past Surgical History:   Procedure Laterality Date    TYMPANOSTOMY TUBE PLACEMENT Bilateral      Family History   Problem Relation Age of Onset    No Known Problems Father     Hypertension Mother     Breast cancer Maternal Grandmother     Colon cancer Maternal Grandmother     Diabetes Maternal Grandmother     Hyperlipidemia Maternal Grandmother     Hypertension Maternal Grandmother     Colon cancer Maternal Uncle     Ovarian  cancer Neg Hx     Uterine cancer Neg Hx      Social History     Socioeconomic History    Marital status: Single   Tobacco Use    Smoking status: Former     Packs/day: .33     Types: Cigarettes     Passive exposure: Current    Smokeless tobacco: Never   Vaping Use    Vaping Use: Some days    Substances: Nicotine, Flavoring    Devices: Disposable   Substance and Sexual Activity    Alcohol use: Never    Drug use: Never    Sexual activity: Yes     Partners: Male     Birth control/protection: None     Review of Systems  Pertinent items are noted in HPI, all other systems reviewed and negative     Objective  Recent Vitals         10/26/2023 10/26/2023 10/26/2023       BP: -- 125/75 --     Pulse: 79 81 79           Physical Exam:   General Appearance: alert, appears stated age and cooperative  Head: normocephalic, without obvious abnormality and atraumatic  Eyes: lids and lashes normal, conjunctivae and sclerae normal, no icterus, no pallor and corneas clear  Lungs: clear to auscultation, respirations regular, respirations even and respirations unlabored  Heart: regular rhythm and normal rate, normal S1, S2, no murmur, gallop, or rubs and no click  Abdomen: normal bowel sounds, no masses, soft non-tender, no guarding and no rebound tenderness, FF  Extremities: moves extremities well, no edema, no cyanosis and no redness  Skin: no bleeding, bruising or rash and no lesions noted  Psych: normal mood and affect, oriented to person, time and place, thought content organized and appropriate judgment    Labs  Lab Results   Component Value Date     10/25/2023    HGB 13.1 10/25/2023    HCT 36.9 10/25/2023    WBC 7.95 10/25/2023     10/25/2023    K 4.3 10/25/2023     10/25/2023    CO2 24.0 10/25/2023    BUN 15 10/25/2023    CREATININE 0.66 10/25/2023    GLUCOSE 90 10/25/2023    ALBUMIN 4.0 10/25/2023    CALCIUM 9.5 10/25/2023    AST 21 10/25/2023    ALT 28 10/25/2023    BILITOT 0.2 10/25/2023      Lab Results    Component Value Date    SQUAMEPIUA 0-2 10/25/2023    SPECGRAVUR 1.016 10/25/2023    KETONESU Negative 10/25/2023    BLOODU Large (3+) (A) 10/25/2023    LEUKOCYTESUR Large (3+) (A) 10/25/2023    NITRITEU Negative 10/25/2023    RBCUA Too Numerous to Count (A) 10/25/2023    WBCUA 21-50 (A) 10/25/2023    BACTERIA 1+ (A) 10/25/2023          Lab Results   Component Value Date    URINECX Final report 03/23/2023        Assessment   Postpartum preeclampsia     Plan   Observation   Magnesium Sulfate @ 2 gm per hour, will continue for 24 hours  Regular diet  SCUDS  Strict I+O  All questions answered and pt in agreement with plan.    Kerri Kunz, DALY  10/26/2023  08:24 EDT

## 2023-10-27 VITALS
SYSTOLIC BLOOD PRESSURE: 123 MMHG | BODY MASS INDEX: 31.93 KG/M2 | RESPIRATION RATE: 16 BRPM | HEIGHT: 63 IN | OXYGEN SATURATION: 97 % | DIASTOLIC BLOOD PRESSURE: 82 MMHG | WEIGHT: 180.2 LBS | TEMPERATURE: 98.2 F | HEART RATE: 75 BPM

## 2023-10-27 PROCEDURE — G0378 HOSPITAL OBSERVATION PER HR: HCPCS

## 2023-10-27 PROCEDURE — 96366 THER/PROPH/DIAG IV INF ADDON: CPT

## 2023-10-27 PROCEDURE — 99238 HOSP IP/OBS DSCHRG MGMT 30/<: CPT | Performed by: MIDWIFE

## 2023-10-27 RX ADMIN — Medication 10 ML: at 01:21

## 2023-10-27 RX ADMIN — DICLOXACILLIN SODIUM 500 MG: 250 CAPSULE ORAL at 01:33

## 2023-10-27 RX ADMIN — DICLOXACILLIN SODIUM 500 MG: 250 CAPSULE ORAL at 08:08

## 2023-10-27 RX ADMIN — ACETAMINOPHEN 650 MG: 325 TABLET, FILM COATED ORAL at 01:31

## 2023-10-27 NOTE — DISCHARGE SUMMARY
Discharge Summary     Jesse Lerma  : 2000  MRN: [id number]  CSN: [id number]    Date of Admission: 10/25/2023   Date of Discharge:  10/27/2023   Delivering Physician: Maddison Tierney        Admission Diagnosis: Preeclampsia [O14.90]  Hypertension in pregnancy, preeclampsia, severe, postpartum condition [O14.15]   Discharge Diagnosis: Same as above plus  Pregnancy at 39w2d - delivered       Procedures: 10/18/2023  - Vaginal, Spontaneous       Hospital Course  Patient is a 23 y.o.  who had  on 10/18. She was readmitted on 10/25 for postpartum preeclampsia.  She received Magnesium Sulfate IV x 24 hours.  On PPD #9 she was ready for discharge.  Her headache has resolved. She is bottle feeding.    Infant  male  fetus weighing 3585 g (7 lb 14.5 oz)   Apgars -  5 @ 1 minute /  9 @ 5 minutes.    Discharge labs  Lab Results   Component Value Date    WBC 7.95 10/25/2023    HGB 13.1 10/25/2023    HCT 36.9 10/25/2023     10/25/2023       Discharge Medications     Discharge Medications        Continue These Medications        Instructions Start Date   chlorhexidine 0.12 % solution  Commonly known as: PERIDEX   15 mL, Mouth/Throat, 2 Times Daily      dicloxacillin 500 MG capsule  Commonly known as: DYNAPEN   500 mg, Oral, 4 Times Daily      ibuprofen 600 MG tablet  Commonly known as: ADVIL,MOTRIN   Take 1 tablet by mouth Every 6 (Six) Hours As Needed for Mild Pain               Discharge Disposition Home or Self Care   Condition on Discharge: good   Follow-up: 1 week with OB in [place]     Time spent on discharge: 30 minutes or less  DALY Kent  10/27/2023

## 2023-10-27 NOTE — PLAN OF CARE
Goal Outcome Evaluation:  Plan of Care Reviewed With: patient, significant other        Progress: improving  Outcome Evaluation: Patient VSS; patient antibiotic therapy complete. Patient states she is feeling much better and no signs of mastitis in both breasts. Patient level well controlled. Patient transitioned appropriatley for discharge to home. Patient to follow-up with her OB/GYN for follow-up in a week per DALY Alba.

## 2023-10-30 ENCOUNTER — POSTPARTUM VISIT (OUTPATIENT)
Dept: OBSTETRICS AND GYNECOLOGY | Facility: CLINIC | Age: 23
End: 2023-10-30
Payer: COMMERCIAL

## 2023-10-30 VITALS
SYSTOLIC BLOOD PRESSURE: 110 MMHG | DIASTOLIC BLOOD PRESSURE: 80 MMHG | WEIGHT: 171 LBS | HEIGHT: 63 IN | BODY MASS INDEX: 30.3 KG/M2

## 2023-10-30 NOTE — PROGRESS NOTES
"      Chief Complaint   Patient presents with    Postpartum Care       Postpartum blood pressure check visit         Sis Lerma is a 23 y.o.  who presents today for a blood pressure check.    Vaginal, Spontaneous    Information for the patient's :  Dayana Lerma [[id number]]   10/18/2023   male   Dayana Lerma   3585 g (7 lb 14.5 oz)   Gestational Age: 39w2d    Baby Discharged: Discharged with Mom  Delivering Physician: Maddison Tierney MD    The patient was diagnosed with Postpartum preeclampsia.  The patient did not go home on blood pressure medication. Her BP has been averaging 115-120/90's since discharge from the hospital. The patient complains of None.  The patient denies None.     Patient describes her vaginal bleeding as light.  Patient is bottle feeding.  Desires contraceptive methods: Nexplanon for contraception. She denies bowel or bladder issues.    Patient denies postpartum depression. Postpartum Depression Screening Questionnaire: 0, no treatment indicated.      The additional following portions of the patient's history were reviewed and updated as appropriate: allergies and current medications.      Review of Systems   Genitourinary:  Positive for vaginal bleeding.   All other systems reviewed and are negative.    All other systems reviewed and are negative.     I have reviewed and agree with the HPI, ROS, and historical information as entered above. Tanya Dan, APRN      Objective   /80   Ht 160 cm (63\")   Wt 77.6 kg (171 lb)   LMP  (Approximate)   Breastfeeding No   BMI 30.29 kg/m²     Physical Exam  Vitals and nursing note reviewed.   Constitutional:       Appearance: Normal appearance. She is well-developed.   HENT:      Head: Normocephalic and atraumatic.   Cardiovascular:      Rate and Rhythm: Normal rate and regular rhythm.   Pulmonary:      Effort: Pulmonary effort is normal.      Breath sounds: Normal breath sounds.   Abdominal:      Palpations: " Abdomen is soft. Abdomen is not rigid.   Musculoskeletal:      Cervical back: Normal range of motion.   Neurological:      Mental Status: She is alert and oriented to person, place, and time.   Psychiatric:         Behavior: Behavior normal.              Assessment and Plan    Problem List Items Addressed This Visit    None  Visit Diagnoses       Postpartum follow-up    -  Primary    Preeclampsia in postpartum period                S/p Vaginal delivery with PP preeclampsia. Pt was readmitted on 10/25/23 with elevated BP's. She was given 24 hours of magnesium sulfate. She was DC'd in stable condition. She was not sent home on BP medication.  Continued pelvic rest.   Pt would like  Nexplanon for contraception.  Continue monitoring BP at home. Call if >140/90 BP today 110/80. Pt reports it has been normal at home this week.  Return in about 4 weeks (around 11/27/2023) for PP.    Tanya Dan, DALY  10/30/2023   Answers submitted by the patient for this visit:  Primary Reason for Visit (Submitted on 10/30/2023)  What is the primary reason for your visit?: High Blood Pressure

## 2023-10-31 NOTE — ED PROVIDER NOTES
"Subjective  History of Present Illness:    Chief Complaint: Bilateral breast pain and swelling, redness    History of Present Illness: 23-year-old female presents with bilateral breast pain swelling and redness, after delivery, not breast-feeding.    Nurses Notes reviewed and agree, including vitals, allergies, social history and prior medical history.     REVIEW OF SYSTEMS: All systems reviewed and not pertinent unless noted.  Review of Systems      Positive for: Bilateral breast pain and swelling and redness     Negative for: Fever    Past Medical History:   Diagnosis Date    ADHD (attention deficit hyperactivity disorder) 2008    Anxiety 2014    Headache 10/24/2023    just these past 2 days.    Tourette syndrome 2016       Allergies:    Patient has no known allergies.      Past Surgical History:   Procedure Laterality Date    TYMPANOSTOMY TUBE PLACEMENT Bilateral 2003         Social History     Socioeconomic History    Marital status: Single   Tobacco Use    Smoking status: Former     Packs/day: .33     Types: Cigarettes     Passive exposure: Current    Smokeless tobacco: Never   Vaping Use    Vaping Use: Some days    Substances: Nicotine, Flavoring    Devices: Disposable   Substance and Sexual Activity    Alcohol use: Never    Drug use: Never    Sexual activity: Yes     Partners: Male     Birth control/protection: None         Family History   Problem Relation Age of Onset    No Known Problems Father     Hypertension Mother     Breast cancer Maternal Grandmother     Colon cancer Maternal Grandmother     Diabetes Maternal Grandmother     Hyperlipidemia Maternal Grandmother     Hypertension Maternal Grandmother     Colon cancer Maternal Uncle     Ovarian cancer Neg Hx     Uterine cancer Neg Hx        Objective  Physical Exam:  /89   Pulse 74   Temp 98 °F (36.7 °C) (Oral)   Resp 18   Ht 160 cm (63\")   Wt 85.4 kg (188 lb 3.2 oz)   LMP  (Approximate)   SpO2 98%   Breastfeeding No   BMI 33.34 kg/m²  "     Physical Exam    CONSTITUTIONAL: Well developed, healthy-appearing nontoxic 43-year-old female,  in mild discomfort.  VITAL SIGNS: per nursing, reviewed and noted  SKIN: exposed skin with no rashes, ulcerations or petechiae.  RN chaperoned breast examination reveals bilateral breast erythema and engorgement, consistent with mastitis  EYES: Grossly EOMI, no icterus  ENT: Normal voice.  Moist mucous membranes   RESPIRATORY:  No increased work of breathing. No retractions.   CARDIOVASCULAR:   Extremities pink and warm.  Good cap refill to extremities.   MUSCULOSKELETAL: Age-appropriate bulk and tone, moves all 4 extremities  NEUROLOGIC: Alert, oriented x 3. No gross deficits. GCS 15. stention, no CVA tenderness    Procedures    ED Course:    Lab Results (last 24 hours)       ** No results found for the last 24 hours. **             No radiology results from the last 24 hrs     MDM         Medical Decision Making:    Initial impression of presenting illness: 23-year-old female presents with bilateral breast pain swelling and redness, after delivery, not breast-feeding.      DDX: includes but is not limited to: Mastitis, breast engorgement,         Patient arrives afebrile no tachycardia sats 98% with vitals interpreted by myself.     Pertinent features from physical exam: Bilateral breast engorgement with mild diffuse erythema.    Initial diagnostic plan: Defer diagnostics we will treat empirically with dicloxacillin,   Results/clinical rationale were discussed with patient    Consultations/Discussion of results with other physicians: None    Disposition plan: Patient was discharged in home stable condition.  Counseled on supportive care, outpatient follow-up. Return precaution discussed.  Patient/family was understanding and agreeable with plan    -----      Final diagnoses:   Mastitis associated with childbirth, delivered            Douglas Tolliver,   10/31/23 0612

## 2023-11-21 ENCOUNTER — TELEPHONE (OUTPATIENT)
Dept: OBSTETRICS AND GYNECOLOGY | Facility: CLINIC | Age: 23
End: 2023-11-21

## 2023-11-21 NOTE — TELEPHONE ENCOUNTER
Caller: Sis Lerma    Relationship to patient: Self    Best call back number: [phone] (home)        PT CANCELLED TODAY'S APPT@1:20.  THANK YOU.

## 2024-09-19 ENCOUNTER — TRANSCRIBE ORDERS (OUTPATIENT)
Dept: ADMINISTRATIVE | Facility: HOSPITAL | Age: 24
End: 2024-09-19
Payer: COMMERCIAL

## 2024-09-19 DIAGNOSIS — R10.10 UPPER ABDOMINAL PAIN: Primary | ICD-10-CM

## 2024-09-23 ENCOUNTER — TRANSCRIBE ORDERS (OUTPATIENT)
Dept: ADMINISTRATIVE | Facility: HOSPITAL | Age: 24
End: 2024-09-23
Payer: COMMERCIAL

## 2024-09-23 DIAGNOSIS — R10.10 UPPER ABDOMINAL PAIN: Primary | ICD-10-CM

## 2024-09-24 ENCOUNTER — HOSPITAL ENCOUNTER (OUTPATIENT)
Dept: ULTRASOUND IMAGING | Facility: HOSPITAL | Age: 24
Discharge: HOME | End: 2024-09-24
Admitting: NURSE PRACTITIONER
Payer: COMMERCIAL

## 2024-09-24 DIAGNOSIS — R10.10 UPPER ABDOMINAL PAIN: ICD-10-CM

## 2024-09-24 PROCEDURE — 76700 US EXAM ABDOM COMPLETE: CPT

## 2024-10-15 ENCOUNTER — OFFICE VISIT (OUTPATIENT)
Dept: GASTROENTEROLOGY | Facility: CLINIC | Age: 24
End: 2024-10-15
Payer: COMMERCIAL

## 2024-10-15 VITALS
OXYGEN SATURATION: 98 % | SYSTOLIC BLOOD PRESSURE: 120 MMHG | BODY MASS INDEX: 33.48 KG/M2 | DIASTOLIC BLOOD PRESSURE: 80 MMHG | WEIGHT: 189 LBS | HEART RATE: 87 BPM

## 2024-10-15 DIAGNOSIS — R19.7 DIARRHEA, UNSPECIFIED TYPE: Chronic | ICD-10-CM

## 2024-10-15 DIAGNOSIS — E66.09 CLASS 1 OBESITY DUE TO EXCESS CALORIES WITHOUT SERIOUS COMORBIDITY WITH BODY MASS INDEX (BMI) OF 33.0 TO 33.9 IN ADULT: Chronic | ICD-10-CM

## 2024-10-15 DIAGNOSIS — R10.10 PAIN OF UPPER ABDOMEN: Primary | Chronic | ICD-10-CM

## 2024-10-15 DIAGNOSIS — K59.00 CONSTIPATION, UNSPECIFIED CONSTIPATION TYPE: Chronic | ICD-10-CM

## 2024-10-15 DIAGNOSIS — K76.0 FATTY (CHANGE OF) LIVER, NOT ELSEWHERE CLASSIFIED: Chronic | ICD-10-CM

## 2024-10-15 DIAGNOSIS — R11.0 NAUSEA: Chronic | ICD-10-CM

## 2024-10-15 DIAGNOSIS — E66.811 CLASS 1 OBESITY DUE TO EXCESS CALORIES WITHOUT SERIOUS COMORBIDITY WITH BODY MASS INDEX (BMI) OF 33.0 TO 33.9 IN ADULT: Chronic | ICD-10-CM

## 2024-10-15 RX ORDER — BUPROPION HYDROCHLORIDE 150 MG/1
TABLET ORAL
COMMUNITY
Start: 2024-09-06

## 2024-10-15 RX ORDER — PANTOPRAZOLE SODIUM 40 MG/1
TABLET, DELAYED RELEASE ORAL
Qty: 30 TABLET | Refills: 3 | Status: SHIPPED | OUTPATIENT
Start: 2024-10-15

## 2024-10-15 RX ORDER — ONDANSETRON 4 MG/1
TABLET, ORALLY DISINTEGRATING ORAL
COMMUNITY
Start: 2024-09-23 | End: 2024-10-15 | Stop reason: ALTCHOICE

## 2024-10-15 RX ORDER — ONDANSETRON 4 MG/1
4 TABLET, FILM COATED ORAL EVERY 8 HOURS PRN
Qty: 30 TABLET | Refills: 1 | Status: SHIPPED | OUTPATIENT
Start: 2024-10-15

## 2024-10-15 NOTE — PROGRESS NOTES
New Patient Consult      Date: 10/15/2024   Patient Name: Sis Lerma  MRN: [id number]  : 2000     Primary Care Provider: Genet Galdamez APRN    Chief Complaint   Patient presents with    Abdominal Pain     10/15/2024  History of Present Illness  The patient is a 24-year-old female here for evaluation of abdominal pain.    She has been experiencing sharp, sudden abdominal pain and nausea for the past 2.5 months. The pain is located in the upper abdomen and is intermittent, having improved recently as it is not constant. Initially, she had difficulty eating, but her appetite has improved, and eating does not worsen the pain. She was prescribed Zofran, which provided nausea relief for about an hour, but she has run out and is requesting a refill. She reports no presence of black tarry stools or vomiting blood. She also has no history of reflux or difficulty swallowing.    She reports alternating between constipation and diarrhea, with diarrhea being more frequent. Symptoms started at the same time as the upper abdominal pain. Her bowel movements occur 2 to 3 times a day, with consistency varying from loose to watery. During periods of constipation, she may go up to 2 days without a bowel movement. Denies any history of rectal bleeding.     She occasionally uses ibuprofen for headaches, but denies any regular use of NSAIDs.    SOCIAL HISTORY  She occasionally vapes. She used marijuana/THC in the past, but she quit about 1 month ago.    Subjective      Past Medical History:   Diagnosis Date    ADHD (attention deficit hyperactivity disorder) 2008    Anxiety 2014    Fatty liver 2024    Headache 10/24/2023    just these past 2 days.    Tourette syndrome 2016     Past Surgical History:   Procedure Laterality Date    TYMPANOSTOMY TUBE PLACEMENT Bilateral     WISDOM TOOTH EXTRACTION       Family History   Problem Relation Age of Onset    No Known Problems Father     Hypertension  Mother     Breast cancer Maternal Grandmother     Colon cancer Maternal Grandmother     Diabetes Maternal Grandmother     Hyperlipidemia Maternal Grandmother     Hypertension Maternal Grandmother     Colon cancer Maternal Uncle     Ovarian cancer Neg Hx     Uterine cancer Neg Hx      Social History     Socioeconomic History    Marital status: Single   Tobacco Use    Smoking status: Former     Current packs/day: 0.33     Types: Cigarettes     Passive exposure: Current    Smokeless tobacco: Never   Vaping Use    Vaping status: Some Days    Substances: Nicotine, THC, Flavoring    Devices: Disposable   Substance and Sexual Activity    Alcohol use: Never    Drug use: Not Currently     Types: Marijuana     Comment: stopped September 2024    Sexual activity: Yes     Partners: Male     Birth control/protection: None       Current Outpatient Medications:     buPROPion XL (WELLBUTRIN XL) 150 MG 24 hr tablet, , Disp: , Rfl:     ondansetron (ZOFRAN) 4 MG tablet, Take 1 tablet by mouth Every 8 (Eight) Hours As Needed for Nausea or Vomiting., Disp: 30 tablet, Rfl: 1    pantoprazole (PROTONIX) 40 MG EC tablet, 1 po daily in the am 30 minutes before breakfast, Disp: 30 tablet, Rfl: 3     No Known Allergies    The following portions of the patient's history were reviewed and updated as appropriate: allergies, current medications, past family history, past medical history, past social history, past surgical history and problem list.    Objective     Physical Exam  Vitals and nursing note reviewed.   Constitutional:       General: She is not in acute distress.     Appearance: Normal appearance. She is well-developed.   HENT:      Head: Normocephalic and atraumatic.      Mouth/Throat:      Mouth: Mucous membranes are not pale, not dry and not cyanotic.   Eyes:      General: Lids are normal.   Neck:      Trachea: Trachea normal.   Cardiovascular:      Rate and Rhythm: Normal rate.   Pulmonary:      Effort: Pulmonary effort is normal. No  respiratory distress.      Breath sounds: Normal breath sounds.   Abdominal:      General: Bowel sounds are normal.      Palpations: Abdomen is soft.      Tenderness: There is no abdominal tenderness.   Skin:     General: Skin is warm and dry.   Neurological:      Mental Status: She is alert and oriented to person, place, and time.   Psychiatric:         Mood and Affect: Mood normal.         Speech: Speech normal.         Behavior: Behavior normal. Behavior is cooperative.       Vitals:    10/15/24 0835   BP: 120/80   Pulse: 87   SpO2: 98%   Weight: 85.7 kg (189 lb)     Body mass index is 33.48 kg/m².     Results Review:   I have reviewed the patient's new clinical and imaging results.    Dated 9/12/2024 alkaline phosphatase 91 ALT 13 AST 20 total bilirubin 0.4 hemoglobin 15.6 hematocrit 46.7 platelet count 237,  H. pylori breath test- negative     US Abdomen Complete     Result Date: 9/24/2024  Small area of focal fatty infiltration in the anterior right lobe of the liver. This is stable from prior CT.          Assessment / Plan      1. Pain of upper abdomen  2. Nausea  3. Diarrhea, unspecified type  4. Constipation, unspecified constipation type  Symptoms started a couple of months ago and have gradually improved.  Denies any episodes of GI bleeding.  Basic labs unremarkable.  H. pylori breath test negative.  Abdominal ultrasound dated 9/24/2024 with small area of fatty liver, otherwise unremarkable.  Symptoms suggestive of functional GI disorder/IBS.  Antireflux measures.  Pantoprazole 40 mg daily.  Zofran as needed for nausea.  Low FODMAP diet  Metamucil or fiber Gummies daily.  Imodium or MiraLAX as needed.  Labs  Fecal calprotectin  EGD if no improvement or symptoms worsen.  Colonoscopy if no improvement, symptoms worsen or if elevated fecal calprotectin.    - pantoprazole (PROTONIX) 40 MG EC tablet; 1 po daily in the am 30 minutes before breakfast  Dispense: 30 tablet; Refill: 3  - ondansetron (ZOFRAN) 4 MG  tablet; Take 1 tablet by mouth Every 8 (Eight) Hours As Needed for Nausea or Vomiting.  Dispense: 30 tablet; Refill: 1  - CBC (No Diff); Future  - Comprehensive Metabolic Panel; Future  - TSH; Future  - IgA; Future  - Tissue Transglutaminase, IgA; Future  - Lipase; Future  - Calprotectin, Fecal - Stool, Per Rectum; Future    5. Fatty (change of) liver, not elsewhere classified  6. Class 1 obesity due to excess calories without serious comorbidity with body mass index (BMI) of 33.0 to 33.9 in adult  BMI 33.48  Liver enzymes normal.  Abdominal ultrasound dated 9/24/2024 with a small area of focal fatty infiltration noted.  No history of IVDA or alcohol use.  Advised low fat diet, exercise and weight reduction.   Labs    - Protime-INR; Future  - Hepatitis A Antibody, Total; Future  - Hepatitis B Surface Antibody; Future  - Hepatitis B Surface Antigen; Future  - Hepatitis B Core Antibody, Total; Future  - Hepatitis C Antibody; Future  - VICK Fibrosure Plus; Future    Patient Instructions   Antireflux measures: Avoid fried, fatty foods, alcohol, chocolate, coffee, tea,  soft drinks, all carbonated beverages (including sparkling water), peppermint and spearmint, spicy foods, tomatoes and tomato based foods, onions, peppers, and garlic.   Other antireflux measures include weight reduction if overweight, avoiding tight clothing around the abdomen, elevating the head of the bed 6 inches with blocks under the head board, and don't drink or eat before going to bed and avoid lying down immediately after meals.  Avoid vaping/smoking/marijuana/THC.  Pantoprazole 40 mg 1 by mouth in the am 30 minutes before breakfast.  Zofran 4 mg 1 po every 8 hours as needed for nausea.  High fiber, low fat diet with liberal water intake.   Metamucil 1 packet/scoop daily or fiber gummies/capsule 2-4 per day.   Low FODMAP diet - avoid all dairy. May use lactose free/dairy free alternatives such as almond milk, rice milk, oat milk, etc.   Imodium 2  mg 1/2-1 tablet 1-2 times per day as needed for diarrhea.   Miralax 17 grams daily as needed for constipation.   May add stool softeners 2 per day as needed for constipation.   Advised to exercise 30 minutes 4-5 days per week.   Advised to reduce weight 15-20 pounds over the next 6-12 months.  Labs  Stool studies  Follow up: 4 months        Low-FODMAP Eating Plan    FODMAP stands for fermentable oligosaccharides, disaccharides, monosaccharides, and polyols. These are sugars that are hard for some people to digest. A low-FODMAP eating plan may help some people who have irritable bowel syndrome (IBS) and certain other bowel (intestinal) diseases to manage their symptoms.  This meal plan can be complicated to follow. Work with a diet and nutrition specialist (dietitian) to make a low-FODMAP eating plan that is right for you. A dietitian can help make sure that you get enough nutrition from this diet.  What are tips for following this plan?  Reading food labels  Check labels for hidden FODMAPs such as:  High-fructose syrup.  Honey.  Agave.  Natural fruit flavors.  Onion or garlic powder.  Choose low-FODMAP foods that contain 3-4 grams of fiber per serving.  Check food labels for serving sizes. Eat only one serving at a time to make sure FODMAP levels stay low.  Shopping  Shop with a list of foods that are recommended on this diet and make a meal plan.  Meal planning  Follow a low-FODMAP eating plan for up to 6 weeks, or as told by your health care provider or dietitian.  To follow the eating plan:  Eliminate high-FODMAP foods from your diet completely. Choose only low-FODMAP foods to eat. You will do this for 2-6 weeks.  Gradually reintroduce high-FODMAP foods into your diet one at a time. Most people should wait a few days before introducing the next new high-FODMAP food into their meal plan. Your dietitian can recommend how quickly you may reintroduce foods.  Keep a daily record of what and how much you eat and drink.  "Make note of any symptoms that you have after eating.  Review your daily record with a dietitian regularly to identify which foods you can eat and which foods you should avoid.  General tips  Drink enough fluid each day to keep your urine pale yellow.  Avoid processed foods. These often have added sugar and may be high in FODMAPs.  Avoid most dairy products, whole grains, and sweeteners.  Work with a dietitian to make sure you get enough fiber in your diet.  Avoid high FODMAP foods at meals to manage symptoms.    Recommended foods  Fruits  Bananas, oranges, tangerines, kaylyn, limes, blueberries, raspberries, strawberries, grapes, cantaloupe, honeydew melon, kiwi, papaya, passion fruit, and pineapple. Limited amounts of dried cranberries, banana chips, and shredded coconut.  Vegetables  Eggplant, zucchini, cucumber, peppers, green beans, bean sprouts, lettuce, arugula, kale, Swiss chard, spinach, cecilio greens, bok january, summer squash, potato, and tomato. Limited amounts of corn, carrot, and sweet potato. Green parts of scallions.  Grains  Gluten-free grains, such as rice, oats, buckwheat, quinoa, corn, polenta, and millet. Gluten-free pasta, bread, or cereal. Rice noodles. Corn tortillas.  Meats and other proteins  Unseasoned beef, pork, poultry, or fish. Eggs. [place]aughlin. Tofu (firm) and tempeh. Limited amounts of nuts and seeds, such as almonds, walnuts, brazil nuts, pecans, peanuts, nut butters, pumpkin seeds, barrera seeds, and sunflower seeds.  Dairy  Lactose-free milk, yogurt, and kefir. Lactose-free cottage cheese and ice cream. Non-dairy milks, such as almond, coconut, hemp, and rice milk. Non-dairy yogurt. Limited amounts of goat cheese, brie, mozzarella, parmesan, swiss, and other hard cheeses.  Fats and oils  Butter-free spreads. Vegetable oils, such as olive, canola, and sunflower oil.  Seasoning and other foods  Artificial sweeteners with names that do not end in \"ol,\" such as aspartame, saccharine, and " stevia. Maple syrup, white table sugar, raw sugar, brown sugar, and molasses. Mayonnaise, soy sauce, and tamari. Fresh basil, coriander, parsley, rosemary, and thyme.  Beverages  Water and mineral water. Sugar-sweetened soft drinks. Small amounts of orange juice or cranberry juice. Black and green tea. Most dry indu. Coffee.  The items listed above may not be a complete list of foods and beverages you can eat. Contact a dietitian for more information.    Foods to avoid  Fruits  Fresh, dried, and juiced forms of apple, pear, watermelon, peach, plum, cherries, apricots, blackberries, boysenberries, figs, nectarines, and christ. Avocado.  Vegetables  Chicory root, artichoke, asparagus, cabbage, snow peas, [place] sprouts, broccoli, sugar snap peas, mushrooms, celery, and cauliflower. Onions, garlic, leeks, and the white part of scallions.  Grains  Wheat, including kamut, durum, and semolina. Barley and bulgur. Couscous. Wheat-based cereals. Wheat noodles, bread, crackers, and pastries.  Meats and other proteins  Fried or fatty meat. Sausage. Cashews and pistachios. Soybeans, baked beans, black beans, chickpeas, kidney beans, jaki beans, navy beans, lentils, black-eyed peas, and split peas.  Dairy  Milk, yogurt, ice cream, and soft cheese. Cream and sour cream. Milk-based sauces. Custard. Buttermilk. Soy milk.  Seasoning and other foods  Any sugar-free gum or candy. Foods that contain artificial sweeteners such as sorbitol, mannitol, isomalt, or xylitol. Foods that contain honey, high-fructose corn syrup, or agave. Bouillon, vegetable stock, beef stock, and chicken stock. Garlic and onion powder. Condiments made with onion, such as hummus, chutney, pickles, relish, salad dressing, and salsa. Tomato paste.  Beverages  Chicory-based drinks. Coffee substitutes. Chamomile tea. Fennel tea. Sweet or fortified indu such as port or nicole. Diet soft drinks made with isomalt, mannitol, maltitol, sorbitol, or xylitol. Apple,  pear, and christ juice. Juices with high-fructose corn syrup.  The items listed above may not be a complete list of foods and beverages you should avoid. Contact a dietitian for more information.    Summary  FODMAP stands for fermentable oligosaccharides, disaccharides, monosaccharides, and polyols. These are sugars that are hard for some people to digest.  A low-FODMAP eating plan is a short-term diet that helps to ease symptoms of certain bowel diseases.  The eating plan usually lasts up to 6 weeks. After that, high-FODMAP foods are reintroduced gradually and one at a time. This can help you find out which foods may be causing symptoms.  A low-FODMAP eating plan can be complicated. It is best to work with a dietitian who has experience with this type of plan.  This information is not intended to replace advice given to you by your health care provider. Make sure you discuss any questions you have with your health care provider.  Document Revised: 05/06/2021 Document Reviewed: 05/06/2021  [employer] Patient Education © 2023 [employer]     DALY Garcia  10/15/2024    Please note that portions of this note may have been completed with a voice recognition program.     Patient or patient representative verbalized consent for the use of Ambient Listening during the visit with  DALY Garcia for chart documentation. 10/15/2024  08:45 EDT

## 2024-10-15 NOTE — PATIENT INSTRUCTIONS
Antireflux measures: Avoid fried, fatty foods, alcohol, chocolate, coffee, tea,  soft drinks, all carbonated beverages (including sparkling water), peppermint and spearmint, spicy foods, tomatoes and tomato based foods, onions, peppers, and garlic.   Other antireflux measures include weight reduction if overweight, avoiding tight clothing around the abdomen, elevating the head of the bed 6 inches with blocks under the head board, and don't drink or eat before going to bed and avoid lying down immediately after meals.  Avoid vaping/smoking/marijuana/THC.  Pantoprazole 40 mg 1 by mouth in the am 30 minutes before breakfast.  Zofran 4 mg 1 po every 8 hours as needed for nausea.  High fiber, low fat diet with liberal water intake.   Metamucil 1 packet/scoop daily or fiber gummies/capsule 2-4 per day.   Low FODMAP diet - avoid all dairy. May use lactose free/dairy free alternatives such as almond milk, rice milk, oat milk, etc.   Imodium 2 mg 1/2-1 tablet 1-2 times per day as needed for diarrhea.   Miralax 17 grams daily as needed for constipation.   May add stool softeners 2 per day as needed for constipation.   Advised to exercise 30 minutes 4-5 days per week.   Advised to reduce weight 15-20 pounds over the next 6-12 months.  Labs  Stool studies  Follow up: 4 months        Low-FODMAP Eating Plan    FODMAP stands for fermentable oligosaccharides, disaccharides, monosaccharides, and polyols. These are sugars that are hard for some people to digest. A low-FODMAP eating plan may help some people who have irritable bowel syndrome (IBS) and certain other bowel (intestinal) diseases to manage their symptoms.  This meal plan can be complicated to follow. Work with a diet and nutrition specialist (dietitian) to make a low-FODMAP eating plan that is right for you. A dietitian can help make sure that you get enough nutrition from this diet.  What are tips for following this plan?  Reading food labels  Check labels for hidden  FODMAPs such as:  High-fructose syrup.  Honey.  Agave.  Natural fruit flavors.  Onion or garlic powder.  Choose low-FODMAP foods that contain 3-4 grams of fiber per serving.  Check food labels for serving sizes. Eat only one serving at a time to make sure FODMAP levels stay low.  Shopping  Shop with a list of foods that are recommended on this diet and make a meal plan.  Meal planning  Follow a low-FODMAP eating plan for up to 6 weeks, or as told by your health care provider or dietitian.  To follow the eating plan:  Eliminate high-FODMAP foods from your diet completely. Choose only low-FODMAP foods to eat. You will do this for 2-6 weeks.  Gradually reintroduce high-FODMAP foods into your diet one at a time. Most people should wait a few days before introducing the next new high-FODMAP food into their meal plan. Your dietitian can recommend how quickly you may reintroduce foods.  Keep a daily record of what and how much you eat and drink. Make note of any symptoms that you have after eating.  Review your daily record with a dietitian regularly to identify which foods you can eat and which foods you should avoid.  General tips  Drink enough fluid each day to keep your urine pale yellow.  Avoid processed foods. These often have added sugar and may be high in FODMAPs.  Avoid most dairy products, whole grains, and sweeteners.  Work with a dietitian to make sure you get enough fiber in your diet.  Avoid high FODMAP foods at meals to manage symptoms.    Recommended foods  Fruits  Bananas, oranges, tangerines, kaylyn, limes, blueberries, raspberries, strawberries, grapes, cantaloupe, honeydew melon, kiwi, papaya, passion fruit, and pineapple. Limited amounts of dried cranberries, banana chips, and shredded coconut.  Vegetables  Eggplant, zucchini, cucumber, peppers, green beans, bean sprouts, lettuce, arugula, kale, Swiss chard, spinach, cecilio greens, bok january, summer squash, potato, and tomato. Limited amounts of corn,  "carrot, and sweet potato. Green parts of scallions.  Grains  Gluten-free grains, such as rice, oats, buckwheat, quinoa, corn, polenta, and millet. Gluten-free pasta, bread, or cereal. Rice noodles. Corn tortillas.  Meats and other proteins  Unseasoned beef, pork, poultry, or fish. Eggs. [place]. Tofu (firm) and tempeh. Limited amounts of nuts and seeds, such as almonds, walnuts, brazil nuts, pecans, peanuts, nut butters, pumpkin seeds, barrera seeds, and sunflower seeds.  Dairy  Lactose-free milk, yogurt, and kefir. Lactose-free cottage cheese and ice cream. Non-dairy milks, such as almond, coconut, hemp, and rice milk. Non-dairy yogurt. Limited amounts of goat cheese, brie, mozzarella, parmesan, swiss, and other hard cheeses.  Fats and oils  Butter-free spreads. Vegetable oils, such as olive, canola, and sunflower oil.  Seasoning and other foods  Artificial sweeteners with names that do not end in \"ol,\" such as aspartame, saccharine, and stevia. Maple syrup, white table sugar, raw sugar, brown sugar, and molasses. Mayonnaise, soy sauce, and tamari. Fresh basil, coriander, parsley, rosemary, and thyme.  Beverages  Water and mineral water. Sugar-sweetened soft drinks. Small amounts of orange juice or cranberry juice. Black and green tea. Most dry indu. Coffee.  The items listed above may not be a complete list of foods and beverages you can eat. Contact a dietitian for more information.    Foods to avoid  Fruits  Fresh, dried, and juiced forms of apple, pear, watermelon, peach, plum, cherries, apricots, blackberries, boysenberries, figs, nectarines, and christ. Avocado.  Vegetables  Chicory root, artichoke, asparagus, cabbage, snow peas, [place] sprouts, broccoli, sugar snap peas, mushrooms, celery, and cauliflower. Onions, garlic, leeks, and the white part of scallions.  Grains  Wheat, including kamut, durum, and semolina. Barley and bulgur. Couscous. Wheat-based cereals. Wheat noodles, bread, crackers, and " pastries.  Meats and other proteins  Fried or fatty meat. Sausage. Cashews and pistachios. Soybeans, baked beans, black beans, chickpeas, kidney beans, jaki beans, navy beans, lentils, black-eyed peas, and split peas.  Dairy  Milk, yogurt, ice cream, and soft cheese. Cream and sour cream. Milk-based sauces. Custard. Buttermilk. Soy milk.  Seasoning and other foods  Any sugar-free gum or candy. Foods that contain artificial sweeteners such as sorbitol, mannitol, isomalt, or xylitol. Foods that contain honey, high-fructose corn syrup, or agave. Bouillon, vegetable stock, beef stock, and chicken stock. Garlic and onion powder. Condiments made with onion, such as hummus, chutney, pickles, relish, salad dressing, and salsa. Tomato paste.  Beverages  Chicory-based drinks. Coffee substitutes. Chamomile tea. Fennel tea. Sweet or fortified indu such as port or nicole. Diet soft drinks made with isomalt, mannitol, maltitol, sorbitol, or xylitol. Apple, pear, and christ juice. Juices with high-fructose corn syrup.  The items listed above may not be a complete list of foods and beverages you should avoid. Contact a dietitian for more information.    Summary  FODMAP stands for fermentable oligosaccharides, disaccharides, monosaccharides, and polyols. These are sugars that are hard for some people to digest.  A low-FODMAP eating plan is a short-term diet that helps to ease symptoms of certain bowel diseases.  The eating plan usually lasts up to 6 weeks. After that, high-FODMAP foods are reintroduced gradually and one at a time. This can help you find out which foods may be causing symptoms.  A low-FODMAP eating plan can be complicated. It is best to work with a dietitian who has experience with this type of plan.  This information is not intended to replace advice given to you by your health care provider. Make sure you discuss any questions you have with your health care provider.  Document Revised: 05/06/2021 Document Reviewed:  05/06/2021  [employer] Patient Education © 2023 [employer]

## 2024-10-16 ENCOUNTER — LAB (OUTPATIENT)
Dept: LAB | Facility: HOSPITAL | Age: 24
End: 2024-10-16
Payer: COMMERCIAL

## 2024-10-16 DIAGNOSIS — K76.0 FATTY (CHANGE OF) LIVER, NOT ELSEWHERE CLASSIFIED: Chronic | ICD-10-CM

## 2024-10-16 DIAGNOSIS — E66.09 CLASS 1 OBESITY DUE TO EXCESS CALORIES WITHOUT SERIOUS COMORBIDITY WITH BODY MASS INDEX (BMI) OF 33.0 TO 33.9 IN ADULT: Chronic | ICD-10-CM

## 2024-10-16 DIAGNOSIS — R19.7 DIARRHEA, UNSPECIFIED TYPE: Chronic | ICD-10-CM

## 2024-10-16 DIAGNOSIS — K59.00 CONSTIPATION, UNSPECIFIED CONSTIPATION TYPE: Chronic | ICD-10-CM

## 2024-10-16 DIAGNOSIS — R11.0 NAUSEA: Chronic | ICD-10-CM

## 2024-10-16 DIAGNOSIS — E66.811 CLASS 1 OBESITY DUE TO EXCESS CALORIES WITHOUT SERIOUS COMORBIDITY WITH BODY MASS INDEX (BMI) OF 33.0 TO 33.9 IN ADULT: Chronic | ICD-10-CM

## 2024-10-16 DIAGNOSIS — R10.10 PAIN OF UPPER ABDOMEN: Chronic | ICD-10-CM

## 2024-10-16 LAB
ALBUMIN SERPL-MCNC: 4.5 G/DL (ref 3.5–5.2)
ALBUMIN/GLOB SERPL: 1.6 G/DL
ALP SERPL-CCNC: 92 U/L (ref 39–117)
ALT SERPL W P-5'-P-CCNC: 17 U/L (ref 1–33)
ANION GAP SERPL CALCULATED.3IONS-SCNC: 12.2 MMOL/L (ref 5–15)
AST SERPL-CCNC: 20 U/L (ref 1–32)
BILIRUB SERPL-MCNC: 0.2 MG/DL (ref 0–1.2)
BUN SERPL-MCNC: 8 MG/DL (ref 6–20)
BUN/CREAT SERPL: 8.9 (ref 7–25)
CALCIUM SPEC-SCNC: 9.4 MG/DL (ref 8.6–10.5)
CHLORIDE SERPL-SCNC: 105 MMOL/L (ref 98–107)
CO2 SERPL-SCNC: 23.8 MMOL/L (ref 22–29)
CREAT SERPL-MCNC: 0.9 MG/DL (ref 0.57–1)
DEPRECATED RDW RBC AUTO: 45 FL (ref 37–54)
EGFRCR SERPLBLD CKD-EPI 2021: 92.3 ML/MIN/1.73
ERYTHROCYTE [DISTWIDTH] IN BLOOD BY AUTOMATED COUNT: 12.5 % (ref 12.3–15.4)
GLOBULIN UR ELPH-MCNC: 2.8 GM/DL
GLUCOSE SERPL-MCNC: 80 MG/DL (ref 65–99)
HBV SURFACE AB SER RIA-ACNC: NORMAL
HBV SURFACE AG SERPL QL IA: NORMAL
HCT VFR BLD AUTO: 42.8 % (ref 34–46.6)
HCV AB SER QL: NORMAL
HGB BLD-MCNC: 14.7 G/DL (ref 12–15.9)
IGA1 MFR SER: 165 MG/DL (ref 70–400)
INR PPP: 0.99 (ref 0.9–1.1)
LIPASE SERPL-CCNC: 25 U/L (ref 13–60)
MCH RBC QN AUTO: 33.8 PG (ref 26.6–33)
MCHC RBC AUTO-ENTMCNC: 34.3 G/DL (ref 31.5–35.7)
MCV RBC AUTO: 98.4 FL (ref 79–97)
PLATELET # BLD AUTO: 209 10*3/MM3 (ref 140–450)
PMV BLD AUTO: 11.3 FL (ref 6–12)
POTASSIUM SERPL-SCNC: 3.7 MMOL/L (ref 3.5–5.2)
PROT SERPL-MCNC: 7.3 G/DL (ref 6–8.5)
PROTHROMBIN TIME: 13.5 SECONDS (ref 12.3–15.1)
RBC # BLD AUTO: 4.35 10*6/MM3 (ref 3.77–5.28)
SODIUM SERPL-SCNC: 141 MMOL/L (ref 136–145)
TSH SERPL DL<=0.05 MIU/L-ACNC: 1.12 UIU/ML (ref 0.27–4.2)
WBC NRBC COR # BLD AUTO: 5.54 10*3/MM3 (ref 3.4–10.8)

## 2024-10-16 PROCEDURE — 82784 ASSAY IGA/IGD/IGG/IGM EACH: CPT

## 2024-10-16 PROCEDURE — 36415 COLL VENOUS BLD VENIPUNCTURE: CPT

## 2024-10-16 PROCEDURE — 85610 PROTHROMBIN TIME: CPT

## 2024-10-16 PROCEDURE — 80050 GENERAL HEALTH PANEL: CPT

## 2024-10-16 PROCEDURE — 86364 TISS TRNSGLTMNASE EA IG CLAS: CPT

## 2024-10-16 PROCEDURE — 83010 ASSAY OF HAPTOGLOBIN QUANT: CPT

## 2024-10-16 PROCEDURE — 82247 BILIRUBIN TOTAL: CPT

## 2024-10-16 PROCEDURE — 82465 ASSAY BLD/SERUM CHOLESTEROL: CPT

## 2024-10-16 PROCEDURE — 86704 HEP B CORE ANTIBODY TOTAL: CPT

## 2024-10-16 PROCEDURE — 84460 ALANINE AMINO (ALT) (SGPT): CPT

## 2024-10-16 PROCEDURE — 82977 ASSAY OF GGT: CPT

## 2024-10-16 PROCEDURE — 84478 ASSAY OF TRIGLYCERIDES: CPT

## 2024-10-16 PROCEDURE — 87340 HEPATITIS B SURFACE AG IA: CPT

## 2024-10-16 PROCEDURE — 82172 ASSAY OF APOLIPOPROTEIN: CPT

## 2024-10-16 PROCEDURE — 86708 HEPATITIS A ANTIBODY: CPT

## 2024-10-16 PROCEDURE — 86803 HEPATITIS C AB TEST: CPT

## 2024-10-16 PROCEDURE — 86706 HEP B SURFACE ANTIBODY: CPT

## 2024-10-16 PROCEDURE — 82947 ASSAY GLUCOSE BLOOD QUANT: CPT

## 2024-10-16 PROCEDURE — 84450 TRANSFERASE (AST) (SGOT): CPT

## 2024-10-16 PROCEDURE — 83690 ASSAY OF LIPASE: CPT

## 2024-10-16 PROCEDURE — 83883 ASSAY NEPHELOMETRY NOT SPEC: CPT

## 2024-10-17 LAB
HAV AB SER QL IA: POSITIVE
HBV CORE AB SERPL QL IA: NEGATIVE
TTG IGA SER-ACNC: <2 U/ML (ref 0–3)

## 2024-10-17 PROCEDURE — 83993 ASSAY FOR CALPROTECTIN FECAL: CPT

## 2024-10-19 LAB
A2 MACROGLOB SERPL-MCNC: 104 MG/DL (ref 110–276)
ALT SERPL W P-5'-P-CCNC: 20 IU/L (ref 0–40)
APO A-I SERPL-MCNC: 122 MG/DL (ref 116–209)
AST SERPL W P-5'-P-CCNC: 21 IU/L (ref 0–40)
BILIRUB SERPL-MCNC: 0.2 MG/DL (ref 0–1.2)
CHOLEST SERPL-MCNC: 150 MG/DL (ref 100–199)
FIBROSIS SCORING:: ABNORMAL
FIBROSIS STAGE SERPL QL: ABNORMAL
GGT SERPL-CCNC: 19 IU/L (ref 0–60)
GLUCOSE SERPL-MCNC: 86 MG/DL (ref 70–99)
HAPTOGLOB SERPL-MCNC: 139 MG/DL (ref 33–278)
LABORATORY COMMENT REPORT: ABNORMAL
LIVER FIBR SCORE SERPL CALC.FIBROSURE: 0.01 (ref 0–0.21)
LIVER STEATOSIS GRADE SERPL QL: ABNORMAL
LIVER STEATOSIS SCORE SERPL: 0.38 (ref 0–0.4)
NASH GRADE SERPL QL: ABNORMAL
NASH INTERPRETATION SERPL-IMP: ABNORMAL
NASH SCORE SERPL: 0 (ref 0–0.25)
NASH SCORING: ABNORMAL
STEATOSIS SCORING: ABNORMAL
TEST PERFORMANCE INFO SPEC: ABNORMAL
TEST PERFORMANCE INFO SPEC: ABNORMAL
TRIGL SERPL-MCNC: 135 MG/DL (ref 0–149)

## 2024-10-21 LAB — CALPROTECTIN STL-MCNT: 39 UG/G (ref 0–120)

## 2024-10-28 ENCOUNTER — PATIENT MESSAGE (OUTPATIENT)
Dept: GASTROENTEROLOGY | Facility: CLINIC | Age: 24
End: 2024-10-28
Payer: COMMERCIAL

## 2025-07-25 ENCOUNTER — APPOINTMENT (OUTPATIENT)
Dept: GENERAL RADIOLOGY | Facility: HOSPITAL | Age: 25
End: 2025-07-25
Payer: COMMERCIAL

## 2025-07-25 ENCOUNTER — HOSPITAL ENCOUNTER (EMERGENCY)
Facility: HOSPITAL | Age: 25
Discharge: HOME | End: 2025-07-25
Attending: STUDENT IN AN ORGANIZED HEALTH CARE EDUCATION/TRAINING PROGRAM
Payer: COMMERCIAL

## 2025-07-25 VITALS
TEMPERATURE: 98.1 F | WEIGHT: 180 LBS | OXYGEN SATURATION: 96 % | RESPIRATION RATE: 18 BRPM | HEIGHT: 63 IN | BODY MASS INDEX: 31.89 KG/M2 | DIASTOLIC BLOOD PRESSURE: 73 MMHG | SYSTOLIC BLOOD PRESSURE: 125 MMHG | HEART RATE: 77 BPM

## 2025-07-25 DIAGNOSIS — S92.144A CLOSED NONDISPLACED FRACTURE OF DOME OF RIGHT TALUS, INITIAL ENCOUNTER: Primary | ICD-10-CM

## 2025-07-25 PROCEDURE — 73630 X-RAY EXAM OF FOOT: CPT

## 2025-07-25 PROCEDURE — 73610 X-RAY EXAM OF ANKLE: CPT

## 2025-07-25 PROCEDURE — 99283 EMERGENCY DEPT VISIT LOW MDM: CPT | Performed by: STUDENT IN AN ORGANIZED HEALTH CARE EDUCATION/TRAINING PROGRAM

## 2025-07-25 NOTE — ED PROVIDER NOTES
Pt Name: Sis Lerma  MRN: [id number]  Pt :   2000  Room Number:  24SF/24  Date of encounter:  2025  PCP: Genet Galdamez APRN  ED Provider: DALY Garcia    Historian: Patient and Family    Subjective    History of Present Illness:    HPI:    Chief Complaint   Patient presents with    Ankle Injury        History of Present Illness: Sis Lerma is a 24 y.o. female who presents to the ER complaining of right ankle pain that started approximately 1 hour prior to arrival.  Patient states she stepped was off of her porch into a hole and lost her balance causing her to twist her ankle and have a mechanical ground-level fall patient states she felt a pop in her right ankle and has had immediate pain..  Pain is described as Throbbing, Constant, and does not radiate  Patient rates pain as a 8 on a ten scale.    Triage Vitals:    ED Triage Vitals [25 1207]   Temp Heart Rate Resp BP SpO2   98.1 °F (36.7 °C) 77 18 125/73 96 %      Temp src Heart Rate Source Patient Position BP Location FiO2 (%)   Oral Monitor Sitting Left arm --       Nurses Notes reviewed and agree, including vitals, allergies, social history and prior medical history.     Patient has no known allergies.    Past Medical History:   Diagnosis Date    ADHD (attention deficit hyperactivity disorder) 2008    Anxiety 2014    Fatty liver 2024    Headache 10/24/2023    just these past 2 days.    Tourette syndrome 2016       Past Surgical History:   Procedure Laterality Date    TYMPANOSTOMY TUBE PLACEMENT Bilateral 2003    WISDOM TOOTH EXTRACTION         Social History     Socioeconomic History    Marital status: Single   Tobacco Use    Smoking status: Former     Current packs/day: 0.33     Types: Cigarettes     Passive exposure: Current    Smokeless tobacco: Never   Vaping Use    Vaping status: Every Day    Substances: Nicotine, THC, Flavoring    Devices: Disposable   Substance and Sexual Activity     Alcohol use: Never    Drug use: Not Currently     Types: Marijuana     Comment: stopped September 2024    Sexual activity: Yes     Partners: Male     Birth control/protection: None       Family History   Problem Relation Age of Onset    No Known Problems Father     Hypertension Mother     Breast cancer Maternal Grandmother     Colon cancer Maternal Grandmother     Diabetes Maternal Grandmother     Hyperlipidemia Maternal Grandmother     Hypertension Maternal Grandmother     Colon cancer Maternal Uncle     Ovarian cancer Neg Hx     Uterine cancer Neg Hx        REVIEW OF SYSTEMS:     All systems reviewed and not pertinent unless noted.    Review of Systems   Musculoskeletal:  Positive for arthralgias, joint swelling and myalgias.   All other systems reviewed and are negative.      Objective    Physical Exam  Vitals and nursing note reviewed.   Constitutional:       Appearance: Normal appearance.   HENT:      Head: Normocephalic and atraumatic.   Eyes:      Extraocular Movements: Extraocular movements intact.      Pupils: Pupils are equal, round, and reactive to light.   Cardiovascular:      Rate and Rhythm: Normal rate and regular rhythm.      Pulses: Normal pulses.      Heart sounds: Normal heart sounds.   Pulmonary:      Effort: Pulmonary effort is normal.      Breath sounds: Normal breath sounds.   Abdominal:      General: Abdomen is flat. Bowel sounds are normal.      Palpations: Abdomen is soft.   Musculoskeletal:      Cervical back: Normal range of motion and neck supple.      Right ankle: Swelling and ecchymosis present. Tenderness present. Decreased range of motion.      Right foot: Swelling and tenderness present.   Skin:     Capillary Refill: Capillary refill takes less than 2 seconds.   Neurological:      General: No focal deficit present.      Mental Status: She is alert and oriented to person, place, and time. Mental status is at baseline.      GCS: GCS eye subscore is 4. GCS verbal subscore is 5. GCS  motor subscore is 6.      Sensory: Sensation is intact.      Motor: Motor function is intact.      Gait: Gait is intact.   Psychiatric:         Attention and Perception: Attention and perception normal.         Mood and Affect: Mood and affect normal.         Speech: Speech normal.         Behavior: Behavior normal. Behavior is cooperative.                           Procedures    ED Course:    No orders to display       ED Course as of 07/25/25 1321   Fri Jul 25, 2025   1305 I have reviewed the mid-level provider(s) note and verbally discussed the case/plan of care.  I was available for consultation as needed at all times during the patient's visit in the Emergency Department.  I agree with the clinical impression, plan, and disposition unless otherwise stated in the MDM below.    ATTENDING ATTESTATION  HPI: 24 y.o. female who presents with right ankle injury.    MDM: ED workup reviewed.    Radiology reports reviewed.     XR Foot 3+ View Right  Result Date: 7/25/2025  Acute fracture as above.      This report was signed and finalized on 7/25/2025 12:59 PM by Amber Velazquez MD.      XR Ankle 3+ View Right  Result Date: 7/25/2025  Medial talar fracture; this is better demonstrated on the foot series.  Lateral soft tissue swelling.      This report was signed and finalized on 7/25/2025 12:58 PM by Amber Velazquez MD.       [JS]      ED Course User Index  [JS] Kolby Valdovinos DO       Orders placed during this visit:    Orders Placed This Encounter   Procedures    [facility] 13. Crutches (), 09. Standard CAM Boot (); Right; Right sided injury    XR Ankle 3+ View Right    XR Foot 3+ View Right    Ambulatory Referral to Orthopedic Surgery       LAB Results:    Lab Results (last 24 hours)       ** No results found for the last 24 hours. **             If labs were ordered, I have independently reviewed the results and considered them in the diagnosis and treatment plan for the patient    RADIOLOGY    XR Foot 3+  View Right  Result Date: 7/25/2025  PROCEDURE: XR FOOT 3+ VW RIGHT-  HISTORY: fall  FINDINGS: A three view exam demonstrates a small bony density adjacent to the medial aspect of the talus only identified on the oblique view consistent with a fracture. Moderate soft tissue swelling over the lateral malleolus noted..  No other fracture identified.      Impression: Acute fracture as above.      This report was signed and finalized on 7/25/2025 12:59 PM by mAber Velazquez MD.      XR Ankle 3+ View Right  Result Date: 7/25/2025   PROCEDURE: XR ANKLE 3+ VW RIGHT-  HISTORY: fall  COMPARISON: None.  FINDINGS:  A three view exam demonstrates no acute fracture or dislocation. The joint spaces appear unremarkable. No soft tissue abnormality is seen. There is a small bony density adjacent to the medial aspect of the right talus seen only on the oblique view. On the foot series this represents a fracture. There is moderate soft tissue swelling over the lateral malleolus. No ankle effusion seen.       Impression: Medial talar fracture; this is better demonstrated on the foot series.  Lateral soft tissue swelling.      This report was signed and finalized on 7/25/2025 12:58 PM by Amber Velazquez MD.         If I have ordered, I have independently reviewed the above noted radiographic studies.  Please see the radiologist dictation for the official interpretation    Medications given to patient in the ER    Medications - No data to display    AS OF 13:21 EDT VITALS:    BP - 125/73  HR - 77  TEMP - 98.1 °F (36.7 °C) (Oral)  O2 SATS - 96%         MEDICAL DECISION MAKING, PROGRESS, and CONSULTS    All labs, if obtained, have been independently reviewed by me.  All radiology studies, if obtained, have been reviewed by me and the radiologist dictating the report.  All EKG's, if obtained, have been independently viewed and interpreted by me      Discussion below represents my analysis of pertinent findings related to patient's condition,  differential diagnosis, treatment plan and final disposition.      Differential diagnosis:    Ankle fracture, foot fracture, ankle sprain, other    Additional Sources:  None      Orders placed during this visit:  Orders Placed This Encounter   Procedures    [facility] 13. Crutches (), 09. Standard CAM Boot (); Right; Right sided injury    XR Ankle 3+ View Right    XR Foot 3+ View Right    Ambulatory Referral to Orthopedic Surgery         ED Course:    Consultants:  None    Sis Lerma is a 24 y.o. female who presents to the ER complaining of right ankle pain that started approximately 1 hour prior to arrival.  Patient states she stepped was off of her porch into a hole and lost her balance causing her to twist her ankle and have a mechanical ground-level fall patient states she felt a pop in her right ankle and has had immediate pain..  Pain is described as Throbbing, Constant, and does not radiate  Patient rates pain as a 8 on a ten scale.    Physical exam the patient is grossly unremarkable except for moderate swelling and minimal ecchymosis to the right ankle.  Moderate tenderness to palpation over medial malleolus, swelling over lateral malleolus with moderate tenderness to palpation x-rays were obtained which does show talus fracture on the medial side have discussed findings with patient and will place patient in walking boot with crutches, weightbearing as tolerated and refer patient to orthopedics outpatient    AS OF 13:21 EDT VITALS:    BP - 125/73  HR - 77  TEMP - 98.1 °F (36.7 °C) (Oral)  O2 SATS - 96%    I reviewed the patient's prescription monitoring report if available prior to discharge    DIAGNOSIS  Final diagnoses:   Closed nondisplaced fracture of dome of right talus, initial encounter         DISPOSITION  ED Disposition       ED Disposition   Discharge    Condition   Stable    Comment   --                   Please note that portions of this document were completed with  voice recognition software.        Sha Farley, APRN  07/25/25 5877

## 2025-07-28 ENCOUNTER — OFFICE VISIT (OUTPATIENT)
Dept: ORTHOPEDIC SURGERY | Facility: CLINIC | Age: 25
End: 2025-07-28
Payer: COMMERCIAL

## 2025-07-28 VITALS
BODY MASS INDEX: 35.79 KG/M2 | SYSTOLIC BLOOD PRESSURE: 110 MMHG | HEIGHT: 63 IN | DIASTOLIC BLOOD PRESSURE: 80 MMHG | WEIGHT: 202 LBS

## 2025-07-28 DIAGNOSIS — S99.921A FOOT INJURY, RIGHT, INITIAL ENCOUNTER: ICD-10-CM

## 2025-07-28 DIAGNOSIS — S92.154A CLOSED NONDISPLACED AVULSION FRACTURE OF RIGHT TALUS, INITIAL ENCOUNTER: Primary | ICD-10-CM

## 2025-07-28 NOTE — PROGRESS NOTES
Subjective   Patient ID: Sis Lerma is a 24 y.o. right hand dominant female  Pain and Injury of the Right Ankle (Reports on 7/25/25 stepping off the deck and stepped into a hole. See at [facility] ER same day. Presents in boot)         Pain  Injury    Patient presents as a new patient to our clinic for the evaluation of right foot and ankle discomfort.  She reports on 7/25/2025 she stepped into a hole.   She was evaluated at [facility], had x-rays of the right foot and right ankle, diagnosed with talus avulsion fracture and provided a low tide walking boot.  She states the pain is tolerable and minimal when using the walking boot.                                                 Past Medical History:   Diagnosis Date    ADHD (attention deficit hyperactivity disorder) 2008    Anxiety 2014    Fatty liver 09/24/2024    Headache 10/24/2023    just these past 2 days.    Tourette syndrome 2016        Past Surgical History:   Procedure Laterality Date    TYMPANOSTOMY TUBE PLACEMENT Bilateral 2003    WISDOM TOOTH EXTRACTION         Family History   Problem Relation Age of Onset    No Known Problems Father     Hypertension Mother     Breast cancer Maternal Grandmother     Colon cancer Maternal Grandmother     Diabetes Maternal Grandmother     Hyperlipidemia Maternal Grandmother     Hypertension Maternal Grandmother     Colon cancer Maternal Uncle     Ovarian cancer Neg Hx     Uterine cancer Neg Hx        Social History     Socioeconomic History    Marital status: Single   Tobacco Use    Smoking status: Former     Current packs/day: 0.33     Types: Cigarettes     Passive exposure: Current    Smokeless tobacco: Never   Vaping Use    Vaping status: Every Day    Substances: Nicotine, THC, Flavoring    Devices: Disposable   Substance and Sexual Activity    Alcohol use: Never    Drug use: Not Currently     Types: Marijuana     Comment: stopped September 2024    Sexual activity: Yes     Partners: Male     Birth  "control/protection: None         Current Outpatient Medications:     buPROPion XL (WELLBUTRIN XL) 150 MG 24 hr tablet, , Disp: , Rfl:     ondansetron (ZOFRAN) 4 MG tablet, Take 1 tablet by mouth Every 8 (Eight) Hours As Needed for Nausea or Vomiting., Disp: 30 tablet, Rfl: 1    pantoprazole (PROTONIX) 40 MG EC tablet, 1 po daily in the am 30 minutes before breakfast, Disp: 30 tablet, Rfl: 3    No Known Allergies    Review of Systems   Musculoskeletal:  Positive for arthralgias (right ankle).       I have reviewed the medical and surgical history, family history, social history, medications, and/or allergies, and the review of systems of this report.    Objective   /80   Ht 160 cm (63\")   Wt 91.6 kg (202 lb)   LMP 07/07/2025 (Approximate)   BMI 35.78 kg/m²    Physical Exam  Vitals and nursing note reviewed.   Constitutional:       Appearance: Normal appearance.   Pulmonary:      Effort: Pulmonary effort is normal.   Musculoskeletal:      Right ankle: No swelling, deformity or ecchymosis. Tenderness present over the ATF ligament. No lateral malleolus, medial malleolus, AITF ligament, CF ligament, posterior TF ligament, base of 5th metatarsal or proximal fibula tenderness. Anterior drawer test negative.      Right Achilles Tendon: No tenderness or defects. [employer] test negative.      Right foot: Normal capillary refill. Tenderness present. No deformity, Charcot foot, foot drop or laceration. Normal pulse.        Legs:    Neurological:      Mental Status: She is alert and oriented to person, place, and time.       Ortho Exam   Extremity DVT signs are negative on physical exam with negative Rob sign, no calf pain, no palpable cords, and no skin tone change   Neurological Exam  Mental Status  Alert. Oriented to person, place, and time.             Assessment & Plan   Independent Review of Radiographic Studies:    No new imaging done today.  Reviewed images and agree with radiologist " interpretation.    PROCEDURE: XR ANKLE 3+ VW RIGHT-     HISTORY: fall     COMPARISON: None.     FINDINGS:  A three view exam demonstrates no acute fracture or  dislocation. The joint spaces appear unremarkable. No soft tissue  abnormality is seen. There is a small bony density adjacent to the  medial aspect of the right talus seen only on the oblique view. On the  foot series this represents a fracture. There is moderate soft tissue  swelling over the lateral malleolus. No ankle effusion seen.        IMPRESSION:  Medial talar fracture; this is better demonstrated on the  foot series.     Lateral soft tissue swelling.                 This report was signed and finalized on 7/25/2025 12:58 PM by Amber Velazquez MD.  PROCEDURE: XR FOOT 3+ VW RIGHT-     HISTORY: fall     FINDINGS: A three view exam demonstrates a small bony density adjacent  to the medial aspect of the talus only identified on the oblique view  consistent with a fracture. Moderate soft tissue swelling over the  lateral malleolus noted..  No other fracture identified.     IMPRESSION:  Acute fracture as above.                 This report was signed and finalized on 7/25/2025 12:59 PM by Amber Velazquez MD.    Procedures       Diagnoses and all orders for this visit:    1. Closed nondisplaced avulsion fracture of right talus, initial encounter (Primary)    2. Foot injury, right, initial encounter       Discussion of orthopedic goals  Orthopedic activities reviewed and patient expressed appreciation  Risk, benefits, and merits of treatment alternatives reviewed with the patient and questions answered  Ice, heat, and/or modalities as beneficial  Reduced physical activity as appropriate and avoid offending activity  Weight bearing parameters reviewed    Recommendations/Plan:  Exercise, medications, injections, other patient advice, and return appointment as noted.  Patient is encouraged to call or return for any issues or concerns.    Return in about 3 weeks  (around 8/18/2025) for XOA of right ankle.  Patient agreeable to call or return sooner for any concerns.    No weight bearing outside of the walking boot.  May remove the boot to apply ice to the sock covered foot 3 x daily  for 20 minutes.      Class 2 Severe Obesity (BMI >=35 and <=39.9). Obesity-related health conditions include the following: hypertension and osteoarthritis. Obesity is newly identified. BMI is is above average; BMI management plan is completed. We discussed low calorie, low carb based diet program, portion control, and increasing exercise.               [employer] Dragon-transcription disclaimer:  This encounter note is an electronic transcription of spoken language to printed text.  Electronic transcription of spoken language may permit erroneous or at times nonsensical words or phrases to be inadvertently transcribed.  Although I have reviewed the note for such errors, some may still exist

## 2025-08-04 ENCOUNTER — PATIENT ROUNDING (BHMG ONLY) (OUTPATIENT)
Dept: ORTHOPEDIC SURGERY | Facility: CLINIC | Age: 25
End: 2025-08-04
Payer: COMMERCIAL

## 2025-08-19 ENCOUNTER — OFFICE VISIT (OUTPATIENT)
Dept: ORTHOPEDIC SURGERY | Facility: CLINIC | Age: 25
End: 2025-08-19
Payer: COMMERCIAL

## 2025-08-19 ENCOUNTER — TELEPHONE (OUTPATIENT)
Dept: ORTHOPEDIC SURGERY | Facility: CLINIC | Age: 25
End: 2025-08-19

## 2025-08-19 VITALS
BODY MASS INDEX: 36.14 KG/M2 | DIASTOLIC BLOOD PRESSURE: 76 MMHG | SYSTOLIC BLOOD PRESSURE: 110 MMHG | HEIGHT: 63 IN | WEIGHT: 204 LBS

## 2025-08-19 DIAGNOSIS — S93.401A SPRAIN OF RIGHT ANKLE, UNSPECIFIED LIGAMENT, INITIAL ENCOUNTER: ICD-10-CM

## 2025-08-19 DIAGNOSIS — S92.154A CLOSED NONDISPLACED AVULSION FRACTURE OF RIGHT TALUS, INITIAL ENCOUNTER: Primary | ICD-10-CM
